# Patient Record
Sex: FEMALE | Race: BLACK OR AFRICAN AMERICAN | NOT HISPANIC OR LATINO | Employment: FULL TIME | URBAN - METROPOLITAN AREA
[De-identification: names, ages, dates, MRNs, and addresses within clinical notes are randomized per-mention and may not be internally consistent; named-entity substitution may affect disease eponyms.]

---

## 2020-12-02 ENCOUNTER — CLINICAL SUPPORT (OUTPATIENT)
Dept: URGENT CARE | Facility: CLINIC | Age: 65
End: 2020-12-02
Payer: COMMERCIAL

## 2020-12-02 VITALS — TEMPERATURE: 98 F

## 2020-12-02 DIAGNOSIS — Z20.822 EXPOSURE TO COVID-19 VIRUS: Primary | ICD-10-CM

## 2020-12-02 LAB
CTP QC/QA: YES
SARS-COV-2 RDRP RESP QL NAA+PROBE: NEGATIVE

## 2020-12-02 PROCEDURE — U0002: ICD-10-PCS | Mod: QW,S$GLB,, | Performed by: NURSE PRACTITIONER

## 2020-12-02 PROCEDURE — U0002 COVID-19 LAB TEST NON-CDC: HCPCS | Mod: QW,S$GLB,, | Performed by: NURSE PRACTITIONER

## 2021-12-08 ENCOUNTER — TELEPHONE (OUTPATIENT)
Dept: TRANSPLANT | Facility: CLINIC | Age: 66
End: 2021-12-08
Payer: MEDICARE

## 2021-12-08 DIAGNOSIS — I50.9 CONGESTIVE HEART FAILURE, UNSPECIFIED HF CHRONICITY, UNSPECIFIED HEART FAILURE TYPE: Primary | ICD-10-CM

## 2022-01-03 ENCOUNTER — OFFICE VISIT (OUTPATIENT)
Dept: TRANSPLANT | Facility: CLINIC | Age: 67
End: 2022-01-03
Payer: MEDICARE

## 2022-01-03 ENCOUNTER — LAB VISIT (OUTPATIENT)
Dept: LAB | Facility: HOSPITAL | Age: 67
End: 2022-01-03
Attending: INTERNAL MEDICINE
Payer: COMMERCIAL

## 2022-01-03 VITALS
HEIGHT: 66 IN | HEART RATE: 64 BPM | SYSTOLIC BLOOD PRESSURE: 112 MMHG | DIASTOLIC BLOOD PRESSURE: 73 MMHG | BODY MASS INDEX: 26.72 KG/M2 | WEIGHT: 166.25 LBS

## 2022-01-03 DIAGNOSIS — I25.10 CORONARY ARTERY DISEASE INVOLVING NATIVE CORONARY ARTERY OF NATIVE HEART WITHOUT ANGINA PECTORIS: ICD-10-CM

## 2022-01-03 DIAGNOSIS — E78.5 DYSLIPIDEMIA: ICD-10-CM

## 2022-01-03 DIAGNOSIS — I50.9 CONGESTIVE HEART FAILURE, UNSPECIFIED HF CHRONICITY, UNSPECIFIED HEART FAILURE TYPE: ICD-10-CM

## 2022-01-03 DIAGNOSIS — I50.9 CONGESTIVE HEART FAILURE, UNSPECIFIED HF CHRONICITY, UNSPECIFIED HEART FAILURE TYPE: Primary | ICD-10-CM

## 2022-01-03 DIAGNOSIS — R06.02 SHORTNESS OF BREATH: ICD-10-CM

## 2022-01-03 DIAGNOSIS — Z87.891 SMOKING HX: ICD-10-CM

## 2022-01-03 DIAGNOSIS — E11.9 DIABETES MELLITUS TYPE 2 WITHOUT RETINOPATHY: ICD-10-CM

## 2022-01-03 LAB
ALBUMIN SERPL BCP-MCNC: 4.2 G/DL (ref 3.5–5.2)
ALP SERPL-CCNC: 60 U/L (ref 55–135)
ALT SERPL W/O P-5'-P-CCNC: 35 U/L (ref 10–44)
ANION GAP SERPL CALC-SCNC: 13 MMOL/L (ref 8–16)
AST SERPL-CCNC: 25 U/L (ref 10–40)
BASOPHILS # BLD AUTO: 0.03 K/UL (ref 0–0.2)
BASOPHILS NFR BLD: 0.4 % (ref 0–1.9)
BILIRUB SERPL-MCNC: 0.7 MG/DL (ref 0.1–1)
BNP SERPL-MCNC: 96 PG/ML (ref 0–99)
BUN SERPL-MCNC: 18 MG/DL (ref 8–23)
CALCIUM SERPL-MCNC: 10 MG/DL (ref 8.7–10.5)
CHLORIDE SERPL-SCNC: 101 MMOL/L (ref 95–110)
CO2 SERPL-SCNC: 28 MMOL/L (ref 23–29)
CREAT SERPL-MCNC: 0.8 MG/DL (ref 0.5–1.4)
DIFFERENTIAL METHOD: ABNORMAL
EOSINOPHIL # BLD AUTO: 0.1 K/UL (ref 0–0.5)
EOSINOPHIL NFR BLD: 1.3 % (ref 0–8)
ERYTHROCYTE [DISTWIDTH] IN BLOOD BY AUTOMATED COUNT: 14.9 % (ref 11.5–14.5)
EST. GFR  (AFRICAN AMERICAN): >60 ML/MIN/1.73 M^2
EST. GFR  (NON AFRICAN AMERICAN): >60 ML/MIN/1.73 M^2
ESTIMATED AVG GLUCOSE: 140 MG/DL (ref 68–131)
GLUCOSE SERPL-MCNC: 120 MG/DL (ref 70–110)
HBA1C MFR BLD: 6.5 % (ref 4–5.6)
HCT VFR BLD AUTO: 41.2 % (ref 37–48.5)
HGB BLD-MCNC: 13.3 G/DL (ref 12–16)
IMM GRANULOCYTES # BLD AUTO: 0.02 K/UL (ref 0–0.04)
IMM GRANULOCYTES NFR BLD AUTO: 0.3 % (ref 0–0.5)
LYMPHOCYTES # BLD AUTO: 3.7 K/UL (ref 1–4.8)
LYMPHOCYTES NFR BLD: 51.2 % (ref 18–48)
MAGNESIUM SERPL-MCNC: 1.9 MG/DL (ref 1.6–2.6)
MCH RBC QN AUTO: 29 PG (ref 27–31)
MCHC RBC AUTO-ENTMCNC: 32.3 G/DL (ref 32–36)
MCV RBC AUTO: 90 FL (ref 82–98)
MONOCYTES # BLD AUTO: 0.4 K/UL (ref 0.3–1)
MONOCYTES NFR BLD: 6.1 % (ref 4–15)
NEUTROPHILS # BLD AUTO: 2.9 K/UL (ref 1.8–7.7)
NEUTROPHILS NFR BLD: 40.7 % (ref 38–73)
NRBC BLD-RTO: 0 /100 WBC
PLATELET # BLD AUTO: 184 K/UL (ref 150–450)
PMV BLD AUTO: 11.2 FL (ref 9.2–12.9)
POTASSIUM SERPL-SCNC: 3.7 MMOL/L (ref 3.5–5.1)
PROT SERPL-MCNC: 7.7 G/DL (ref 6–8.4)
RBC # BLD AUTO: 4.59 M/UL (ref 4–5.4)
SODIUM SERPL-SCNC: 142 MMOL/L (ref 136–145)
TSH SERPL DL<=0.005 MIU/L-ACNC: 1.11 UIU/ML (ref 0.4–4)
WBC # BLD AUTO: 7.19 K/UL (ref 3.9–12.7)

## 2022-01-03 PROCEDURE — 84443 ASSAY THYROID STIM HORMONE: CPT | Performed by: INTERNAL MEDICINE

## 2022-01-03 PROCEDURE — 80053 COMPREHEN METABOLIC PANEL: CPT | Performed by: INTERNAL MEDICINE

## 2022-01-03 PROCEDURE — 99999 PR PBB SHADOW E&M-EST. PATIENT-LVL IV: ICD-10-PCS | Mod: PBBFAC,,, | Performed by: INTERNAL MEDICINE

## 2022-01-03 PROCEDURE — 83735 ASSAY OF MAGNESIUM: CPT | Performed by: INTERNAL MEDICINE

## 2022-01-03 PROCEDURE — 85025 COMPLETE CBC W/AUTO DIFF WBC: CPT | Performed by: INTERNAL MEDICINE

## 2022-01-03 PROCEDURE — 99999 PR PBB SHADOW E&M-EST. PATIENT-LVL IV: CPT | Mod: PBBFAC,,, | Performed by: INTERNAL MEDICINE

## 2022-01-03 PROCEDURE — 99204 OFFICE O/P NEW MOD 45 MIN: CPT | Mod: S$PBB,,, | Performed by: INTERNAL MEDICINE

## 2022-01-03 PROCEDURE — 99214 OFFICE O/P EST MOD 30 MIN: CPT | Mod: PBBFAC | Performed by: INTERNAL MEDICINE

## 2022-01-03 PROCEDURE — 83036 HEMOGLOBIN GLYCOSYLATED A1C: CPT | Performed by: INTERNAL MEDICINE

## 2022-01-03 PROCEDURE — 36415 COLL VENOUS BLD VENIPUNCTURE: CPT | Performed by: INTERNAL MEDICINE

## 2022-01-03 PROCEDURE — 99204 PR OFFICE/OUTPT VISIT, NEW, LEVL IV, 45-59 MIN: ICD-10-PCS | Mod: S$PBB,,, | Performed by: INTERNAL MEDICINE

## 2022-01-03 PROCEDURE — 83880 ASSAY OF NATRIURETIC PEPTIDE: CPT | Mod: 91 | Performed by: INTERNAL MEDICINE

## 2022-01-03 PROCEDURE — 83880 ASSAY OF NATRIURETIC PEPTIDE: CPT | Performed by: INTERNAL MEDICINE

## 2022-01-03 RX ORDER — ALBUTEROL SULFATE 90 UG/1
2 AEROSOL, METERED RESPIRATORY (INHALATION) EVERY 4 HOURS PRN
COMMUNITY
Start: 2021-03-14

## 2022-01-03 RX ORDER — ISOSORBIDE MONONITRATE 30 MG/1
30 TABLET, EXTENDED RELEASE ORAL DAILY
COMMUNITY
Start: 2021-10-21 | End: 2023-01-06

## 2022-01-03 RX ORDER — SACUBITRIL AND VALSARTAN 24; 26 MG/1; MG/1
1 TABLET, FILM COATED ORAL 2 TIMES DAILY
Qty: 60 TABLET | Refills: 5 | Status: SHIPPED | OUTPATIENT
Start: 2022-01-03 | End: 2022-02-02

## 2022-01-03 RX ORDER — METOPROLOL SUCCINATE 100 MG/1
150 TABLET, EXTENDED RELEASE ORAL DAILY
COMMUNITY
Start: 2021-12-17

## 2022-01-03 RX ORDER — BUMETANIDE 2 MG/1
2 TABLET ORAL DAILY
Status: ON HOLD | COMMUNITY
Start: 2021-12-24 | End: 2022-02-02 | Stop reason: SDUPTHER

## 2022-01-03 RX ORDER — GABAPENTIN 300 MG/1
300 CAPSULE ORAL 3 TIMES DAILY PRN
COMMUNITY

## 2022-01-03 RX ORDER — POTASSIUM CHLORIDE 750 MG/1
20 TABLET, EXTENDED RELEASE ORAL DAILY
Status: ON HOLD | COMMUNITY
Start: 2021-12-26 | End: 2022-02-02 | Stop reason: HOSPADM

## 2022-01-03 RX ORDER — ALLOPURINOL 100 MG/1
100 TABLET ORAL
COMMUNITY

## 2022-01-03 RX ORDER — CETIRIZINE HYDROCHLORIDE 10 MG/1
10 TABLET ORAL DAILY
COMMUNITY
Start: 2021-08-22

## 2022-01-03 RX ORDER — FLUCONAZOLE 100 MG/1
100 TABLET ORAL DAILY
COMMUNITY
Start: 2021-11-04 | End: 2023-01-06

## 2022-01-03 RX ORDER — BUDESONIDE AND FORMOTEROL FUMARATE DIHYDRATE 80; 4.5 UG/1; UG/1
2 AEROSOL RESPIRATORY (INHALATION)
COMMUNITY
Start: 2021-03-14

## 2022-01-03 RX ORDER — LISINOPRIL 5 MG/1
5 TABLET ORAL DAILY
COMMUNITY
Start: 2021-10-25 | End: 2022-01-03 | Stop reason: ALTCHOICE

## 2022-01-03 RX ORDER — SACUBITRIL AND VALSARTAN 24; 26 MG/1; MG/1
1 TABLET, FILM COATED ORAL 2 TIMES DAILY
Qty: 60 TABLET | Refills: 5 | Status: SHIPPED | OUTPATIENT
Start: 2022-01-03 | End: 2022-01-03 | Stop reason: SDUPTHER

## 2022-01-03 RX ORDER — ATORVASTATIN CALCIUM 80 MG/1
80 TABLET, FILM COATED ORAL DAILY
COMMUNITY
Start: 2021-10-19

## 2022-01-03 RX ORDER — ASPIRIN 81 MG/1
81 TABLET ORAL
COMMUNITY

## 2022-01-03 RX ORDER — METFORMIN HYDROCHLORIDE 500 MG/1
TABLET, EXTENDED RELEASE ORAL DAILY
COMMUNITY
Start: 2021-10-21

## 2022-01-03 RX ORDER — EMPAGLIFLOZIN 10 MG/1
10 TABLET, FILM COATED ORAL DAILY
COMMUNITY
Start: 2021-12-26 | End: 2023-01-06

## 2022-01-03 RX ORDER — TICAGRELOR 90 MG/1
90 TABLET ORAL 2 TIMES DAILY
COMMUNITY
Start: 2021-12-17

## 2022-01-03 NOTE — H&P (VIEW-ONLY)
"                                                                                       [facility] Follow up.        Attending Physician: Jabari Godfrey MD.  The patient's last visit with me was on Visit date not found.         HPI.  67 yo woman with a PMH/o HTN, dyslipidemia, diabetes type 2, CAD, STEMI Feb, 2021, evidence on MI in LAD distribution on nuclear stress test, last [facility] February 2021, when she had diffuse distal RCA 80-90% disease, ostial OM1 disease and LAD occlusion s/p PCI to LAD at that time. Ischemic cardiomyopathy, TTE on July 26, 2021: 25-30%. S/p ICD.  She also has a history of COPD, gout. Per her outside records, she has been treated with lisinopril 5 mg daily, metoprolol xl 150 mg daily, empagliflozin 10 mg daily, ISMN, bumex 1 mg daily, asa, statins.    Today she said she feels well.  She does not feel limited. Only time that she becomes short of breath is when climbing stairs. She denies congestive symptoms. She is active at home. She said since she has been taking bumex, she feels "pretty good". After her index MI she only was admitted for HF only once. She said it was before she started taking diuretics.     Review of Systems   Constitutional: Negative for activity change, appetite change, chills, diaphoresis, fatigue, fever and unexpected weight change.   HENT: Negative for nasal congestion, rhinorrhea and sore throat.    Eyes: Negative for visual disturbance.   Respiratory: Negative for apnea, chest tightness and shortness of breath.    Cardiovascular: Negative for chest pain and leg swelling.   Gastrointestinal: Negative for abdominal distention, abdominal pain, diarrhea, nausea and vomiting.   Genitourinary: Negative for difficulty urinating, dysuria and hematuria.   Integumentary:  Negative for rash.   Neurological: Negative for seizures, syncope and light-headedness.   Psychiatric/Behavioral: Negative for agitation and hallucinations.    " "        Review of patient's allergies indicates:   Allergen Reactions    Sulfa (sulfonamide antibiotics) Itching    Penicillins Rash        Current Outpatient Medications   Medication Instructions    albuterol (PROVENTIL/VENTOLIN HFA) 90 mcg/actuation inhaler 2 puffs, Inhalation, Every 4 hours PRN    allopurinoL (ZYLOPRIM) 100 mg, Oral    aspirin (ECOTRIN) 81 mg, Oral    atorvastatin (LIPITOR) 80 mg, Oral, Daily    BRILINTA 90 mg, Oral, 2 times daily    budesonide-formoterol 80-4.5 mcg (SYMBICORT) 80-4.5 mcg/actuation HFAA 2 puffs, Inhalation    bumetanide (BUMEX) 2 mg, Oral, Daily    cetirizine (ZYRTEC) 10 mg, Oral, Daily    fluconazole (DIFLUCAN) 100 mg, Oral, Daily    gabapentin (NEURONTIN) 300 mg, Oral, 3 times daily PRN    isosorbide mononitrate (IMDUR) 30 mg, Oral, Daily    JARDIANCE 10 mg, Oral, Daily    lisinopriL (PRINIVIL,ZESTRIL) 5 mg, Oral, Daily    metFORMIN (GLUCOPHAGE-XR) 500 MG ER 24hr tablet Oral, Daily    metoprolol succinate (TOPROL-XL) 150 mg, Oral, Daily    potassium chloride (KLOR-CON) 10 MEQ TbSR 20 mEq, Oral, Daily        Vitals:    01/03/22 0826   BP: 112/73   Pulse: 64        Wt Readings from Last 3 Encounters:   01/03/22 75.4 kg (166 lb 3.6 oz)     Temp Readings from Last 3 Encounters:   12/02/20 97.7 °F (36.5 °C)     BP Readings from Last 3 Encounters:   01/03/22 112/73     Pulse Readings from Last 3 Encounters:   01/03/22 64        Body mass index is 26.83 kg/m². Estimated body surface area is 1.87 meters squared as calculated from the following:    Height as of this encounter: 5' 6" (1.676 m).    Weight as of this encounter: 75.4 kg (166 lb 3.6 oz).     Physical Exam  Constitutional:       Appearance: She is well-developed and well-nourished.   HENT:      Head: Normocephalic and atraumatic.      Right Ear: External ear normal.      Left Ear: External ear normal.   Eyes:      Extraocular Movements: EOM normal.      Conjunctiva/sclera: Conjunctivae normal.      Pupils: " Pupils are equal, round, and reactive to light.   Neck:      Vascular: No hepatojugular reflux or JVD.   Cardiovascular:      Rate and Rhythm: Normal rate and regular rhythm.      Pulses: Intact distal pulses.      Heart sounds: S1 normal and S2 normal. No murmur heard.  No friction rub. No gallop.    Pulmonary:      Effort: Pulmonary effort is normal.      Breath sounds: Normal breath sounds.   Abdominal:      General: Bowel sounds are normal. There is no distension.      Palpations: Abdomen is soft.      Tenderness: There is no abdominal tenderness. There is no guarding or rebound.   Musculoskeletal:         General: No edema.      Cervical back: Normal range of motion and neck supple.      Right lower leg: No edema.      Left lower leg: No edema.   Neurological:      Mental Status: She is alert and oriented to person, place, and time.      Deep Tendon Reflexes: Strength normal.          Lab Results   Component Value Date    HGBA1C 6.8 (H) 08/18/2021    CHOL 137 02/08/2021    HDL 29 (L) 02/08/2021    LDLCALC 72 02/08/2021    TRIG 181 (H) 02/08/2021               Assessment and Plan:  Congestive heart failure, unspecified HF chronicity, unspecified heart failure type  -     CBC Auto Differential; Future; Expected date: 01/03/2022  -     Comprehensive Metabolic Panel; Future; Expected date: 01/03/2022  -     NT-Pro Natriuretic Peptide; Future; Expected date: 01/03/2022  -     BNP; Future; Expected date: 01/03/2022  -     Hemoglobin A1C; Future; Expected date: 01/03/2022  -     CPX Study; Future; Expected date: 01/04/2022  -     Echo; Future; Expected date: 01/03/2022  -     EKG 12-lead; Future; Expected date: 01/03/2022  -     Case Request-Cath Lab: INSERTION, CATHETER, RIGHT HEART  -     Basic Metabolic Panel; Future; Expected date: 01/10/2022  -     CBC Auto Differential; Future; Expected date: 01/10/2022  -     Basic Metabolic Panel; Future; Expected date: 01/10/2022  -     CBC Auto Differential; Future; Expected  date: 04/03/2022  -     Comprehensive Metabolic Panel; Future; Expected date: 04/03/2022  -     NT-Pro Natriuretic Peptide; Future; Expected date: 04/03/2022    Shortness of breath  -     COVID-19 Routine Screening; Future; Expected date: 01/04/2022    Coronary artery disease involving native coronary artery of native heart without angina pectoris    Diabetes mellitus type 2 without retinopathy    Dyslipidemia    Smoking hx    Other orders  -     Discontinue: sacubitriL-valsartan (ENTRESTO) 24-26 mg per tablet; Take 1 tablet by mouth 2 (two) times daily.  Dispense: 60 tablet; Refill: 5  -     sacubitriL-valsartan (ENTRESTO) 24-26 mg per tablet; Take 1 tablet by mouth 2 (two) times daily.  Dispense: 60 tablet; Refill: 5          1. Chronic systolic HF, NYHA class II, stage C.  Etiology: ischemic CMP, new diagnosis in 2021, after STEMI in LAD distribution.  Devices: ICD.  Medications: metoprolol succinate, lisinopril low dose and jardiance. Bumex 1-2 mg daily (1 mg daily most of the days).  Hemodynamic status: warm, normotensive, euvolemic on exam.  Clinical course:   Plan:  -patient to adhere to a fluid restriction of NMT 1.5 liters/24h.  -patient to adhere to  low sodium diet, NMT 1.5 grams of sodium in a day.  -patient was asked to weight himself every day and to keep daily record of his weights.    -Baseline blood tests today (I ordered them).  -ECG, echo, right heart cath and exercise test at patient's earliest convenience.  -switch from lisinopril to entresto. Patient was asked to stop taking lisinopril 2 days before starting entresto.  -Follow up blood test Monday (any [facility] lab).  -patient was asked to call us every 2 weeks for next upgrade on her medications.  -Follow up in 3 months with labs.

## 2022-01-03 NOTE — PROGRESS NOTES
"                                                                                       Advanced [facility] Failure [facility] Transplantation [facility] Follow up.        Attending Physician: Jabari Godfrey MD.  The patient's last visit with me was on Visit date not found.         HPI.  65 yo woman with a PMH/o HTN, dyslipidemia, diabetes type 2, CAD, STEMI Feb, 2021, evidence on MI in LAD distribution on nuclear stress test, last [facility] February 2021, when she had diffuse distal RCA 80-90% disease, ostial OM1 disease and LAD occlusion s/p PCI to LAD at that time. Ischemic cardiomyopathy, TTE on July 26, 2021: 25-30%. S/p ICD.  She also has a history of COPD, gout. Per her outside records, she has been treated with lisinopril 5 mg daily, metoprolol xl 150 mg daily, empagliflozin 10 mg daily, ISMN, bumex 1 mg daily, asa, statins.    Today she said she feels well.  She does not feel limited. Only time that she becomes short of breath is when climbing stairs. She denies congestive symptoms. She is active at home. She said since she has been taking bumex, she feels "pretty good". After her index MI she only was admitted for HF only once. She said it was before she started taking diuretics.     Review of Systems   Constitutional: Negative for activity change, appetite change, chills, diaphoresis, fatigue, fever and unexpected weight change.   HENT: Negative for nasal congestion, rhinorrhea and sore throat.    Eyes: Negative for visual disturbance.   Respiratory: Negative for apnea, chest tightness and shortness of breath.    Cardiovascular: Negative for chest pain and leg swelling.   Gastrointestinal: Negative for abdominal distention, abdominal pain, diarrhea, nausea and vomiting.   Genitourinary: Negative for difficulty urinating, dysuria and hematuria.   Integumentary:  Negative for rash.   Neurological: Negative for seizures, syncope and light-headedness.   Psychiatric/Behavioral: Negative for agitation and hallucinations.    " "        Review of patient's allergies indicates:   Allergen Reactions    Sulfa (sulfonamide antibiotics) Itching    Penicillins Rash        Current Outpatient Medications   Medication Instructions    albuterol (PROVENTIL/VENTOLIN HFA) 90 mcg/actuation inhaler 2 puffs, Inhalation, Every 4 hours PRN    allopurinoL (ZYLOPRIM) 100 mg, Oral    aspirin (ECOTRIN) 81 mg, Oral    atorvastatin (LIPITOR) 80 mg, Oral, Daily    BRILINTA 90 mg, Oral, 2 times daily    budesonide-formoterol 80-4.5 mcg (SYMBICORT) 80-4.5 mcg/actuation HFAA 2 puffs, Inhalation    bumetanide (BUMEX) 2 mg, Oral, Daily    cetirizine (ZYRTEC) 10 mg, Oral, Daily    fluconazole (DIFLUCAN) 100 mg, Oral, Daily    gabapentin (NEURONTIN) 300 mg, Oral, 3 times daily PRN    isosorbide mononitrate (IMDUR) 30 mg, Oral, Daily    JARDIANCE 10 mg, Oral, Daily    lisinopriL (PRINIVIL,ZESTRIL) 5 mg, Oral, Daily    metFORMIN (GLUCOPHAGE-XR) 500 MG ER 24hr tablet Oral, Daily    metoprolol succinate (TOPROL-XL) 150 mg, Oral, Daily    potassium chloride (KLOR-CON) 10 MEQ TbSR 20 mEq, Oral, Daily        Vitals:    01/03/22 0826   BP: 112/73   Pulse: 64        Wt Readings from Last 3 Encounters:   01/03/22 75.4 kg (166 lb 3.6 oz)     Temp Readings from Last 3 Encounters:   12/02/20 97.7 °F (36.5 °C)     BP Readings from Last 3 Encounters:   01/03/22 112/73     Pulse Readings from Last 3 Encounters:   01/03/22 64        Body mass index is 26.83 kg/m². Estimated body surface area is 1.87 meters squared as calculated from the following:    Height as of this encounter: 5' 6" (1.676 m).    Weight as of this encounter: 75.4 kg (166 lb 3.6 oz).     Physical Exam  Constitutional:       Appearance: She is well-developed and well-nourished.   HENT:      Head: Normocephalic and atraumatic.      Right Ear: External ear normal.      Left Ear: External ear normal.   Eyes:      Extraocular Movements: EOM normal.      Conjunctiva/sclera: Conjunctivae normal.      Pupils: " Pupils are equal, round, and reactive to light.   Neck:      Vascular: No hepatojugular reflux or JVD.   Cardiovascular:      Rate and Rhythm: Normal rate and regular rhythm.      Pulses: Intact distal pulses.      Heart sounds: S1 normal and S2 normal. No murmur heard.  No friction rub. No gallop.    Pulmonary:      Effort: Pulmonary effort is normal.      Breath sounds: Normal breath sounds.   Abdominal:      General: Bowel sounds are normal. There is no distension.      Palpations: Abdomen is soft.      Tenderness: There is no abdominal tenderness. There is no guarding or rebound.   Musculoskeletal:         General: No edema.      Cervical back: Normal range of motion and neck supple.      Right lower leg: No edema.      Left lower leg: No edema.   Neurological:      Mental Status: She is alert and oriented to person, place, and time.      Deep Tendon Reflexes: Strength normal.          Lab Results   Component Value Date    HGBA1C 6.8 (H) 08/18/2021    CHOL 137 02/08/2021    HDL 29 (L) 02/08/2021    LDLCALC 72 02/08/2021    TRIG 181 (H) 02/08/2021               Assessment and Plan:  Congestive heart failure, unspecified HF chronicity, unspecified heart failure type  -     CBC Auto Differential; Future; Expected date: 01/03/2022  -     Comprehensive Metabolic Panel; Future; Expected date: 01/03/2022  -     NT-Pro Natriuretic Peptide; Future; Expected date: 01/03/2022  -     BNP; Future; Expected date: 01/03/2022  -     Hemoglobin A1C; Future; Expected date: 01/03/2022  -     CPX Study; Future; Expected date: 01/04/2022  -     Echo; Future; Expected date: 01/03/2022  -     EKG 12-lead; Future; Expected date: 01/03/2022  -     Case Request-Cath Lab: INSERTION, CATHETER, RIGHT HEART  -     Basic Metabolic Panel; Future; Expected date: 01/10/2022  -     CBC Auto Differential; Future; Expected date: 01/10/2022  -     Basic Metabolic Panel; Future; Expected date: 01/10/2022  -     CBC Auto Differential; Future; Expected  date: 04/03/2022  -     Comprehensive Metabolic Panel; Future; Expected date: 04/03/2022  -     NT-Pro Natriuretic Peptide; Future; Expected date: 04/03/2022    Shortness of breath  -     COVID-19 Routine Screening; Future; Expected date: 01/04/2022    Coronary artery disease involving native coronary artery of native heart without angina pectoris    Diabetes mellitus type 2 without retinopathy    Dyslipidemia    Smoking hx    Other orders  -     Discontinue: sacubitriL-valsartan (ENTRESTO) 24-26 mg per tablet; Take 1 tablet by mouth 2 (two) times daily.  Dispense: 60 tablet; Refill: 5  -     sacubitriL-valsartan (ENTRESTO) 24-26 mg per tablet; Take 1 tablet by mouth 2 (two) times daily.  Dispense: 60 tablet; Refill: 5          1. Chronic systolic HF, NYHA class II, stage C.  Etiology: ischemic CMP, new diagnosis in 2021, after STEMI in LAD distribution.  Devices: ICD.  Medications: metoprolol succinate, lisinopril low dose and jardiance. Bumex 1-2 mg daily (1 mg daily most of the days).  Hemodynamic status: warm, normotensive, euvolemic on exam.  Clinical course:   Plan:  -patient to adhere to a fluid restriction of NMT 1.5 liters/24h.  -patient to adhere to  low sodium diet, NMT 1.5 grams of sodium in a day.  -patient was asked to weight himself every day and to keep daily record of his weights.    -Baseline blood tests today (I ordered them).  -ECG, echo, right heart cath and exercise test at patient's earliest convenience.  -switch from lisinopril to entresto. Patient was asked to stop taking lisinopril 2 days before starting entresto.  -Follow up blood test Monday (any [facility] lab).  -patient was asked to call us every 2 weeks for next upgrade on her medications.  -Follow up in 3 months with labs.

## 2022-01-03 NOTE — PATIENT INSTRUCTIONS
1. You have just the right amount of fluid on you.  2. Please adhere to a low sodium diet (no more than 1.5 grams of sodium in 24h).  3.   Follow fluid restriction of  2. no more than 1.5 liters in 24 hours..  4. Please have all blood tests done today, ASAP.  5. Please schedule ECG, echo, right heart cath and exercise test at your earliest convenience.  6. You have been prescribed entresto. Please stop taking lisinopril 2 days before starting entresto.  7. Call us if you can not get entresto filled.  8. Follow up blood test Monday (any [facility] lab).  9. Call us every 2 weeks for next upgrade on your medications.  10. Follow up in 3 months with labs.  11. We will call you after labs are resulted to confirm initiation of entresto.

## 2022-01-04 LAB — NT-PROBNP SERPL-MCNC: 248 PG/ML

## 2022-02-02 ENCOUNTER — HOSPITAL ENCOUNTER (OUTPATIENT)
Dept: CARDIOLOGY | Facility: HOSPITAL | Age: 67
Discharge: HOME | End: 2022-02-02
Attending: INTERNAL MEDICINE
Payer: MEDICARE

## 2022-02-02 ENCOUNTER — OFFICE VISIT (OUTPATIENT)
Dept: TRANSPLANT | Facility: CLINIC | Age: 67
End: 2022-02-02
Payer: MEDICARE

## 2022-02-02 ENCOUNTER — HOSPITAL ENCOUNTER (OUTPATIENT)
Dept: CARDIOLOGY | Facility: HOSPITAL | Age: 67
Discharge: HOME | End: 2022-02-02
Attending: INTERNAL MEDICINE | Admitting: INTERNAL MEDICINE
Payer: MEDICARE

## 2022-02-02 ENCOUNTER — HOSPITAL ENCOUNTER (OUTPATIENT)
Dept: CARDIOLOGY | Facility: CLINIC | Age: 67
Discharge: HOME | End: 2022-02-02
Payer: MEDICARE

## 2022-02-02 ENCOUNTER — HOSPITAL ENCOUNTER (OUTPATIENT)
Facility: HOSPITAL | Age: 67
Discharge: HOME | End: 2022-02-02
Attending: INTERNAL MEDICINE | Admitting: INTERNAL MEDICINE
Payer: MEDICARE

## 2022-02-02 VITALS
SYSTOLIC BLOOD PRESSURE: 114 MMHG | BODY MASS INDEX: 26.52 KG/M2 | WEIGHT: 165 LBS | HEIGHT: 66 IN | DIASTOLIC BLOOD PRESSURE: 78 MMHG | HEART RATE: 72 BPM

## 2022-02-02 VITALS
BODY MASS INDEX: 27.7 KG/M2 | WEIGHT: 166.25 LBS | HEART RATE: 79 BPM | DIASTOLIC BLOOD PRESSURE: 55 MMHG | HEIGHT: 65 IN | SYSTOLIC BLOOD PRESSURE: 95 MMHG

## 2022-02-02 VITALS
BODY MASS INDEX: 26.68 KG/M2 | HEIGHT: 66 IN | WEIGHT: 166 LBS | DIASTOLIC BLOOD PRESSURE: 68 MMHG | SYSTOLIC BLOOD PRESSURE: 102 MMHG | HEART RATE: 67 BPM

## 2022-02-02 VITALS
SYSTOLIC BLOOD PRESSURE: 119 MMHG | HEIGHT: 66 IN | BODY MASS INDEX: 26.45 KG/M2 | DIASTOLIC BLOOD PRESSURE: 72 MMHG | RESPIRATION RATE: 18 BRPM | HEART RATE: 70 BPM | OXYGEN SATURATION: 98 % | TEMPERATURE: 98 F | WEIGHT: 164.56 LBS

## 2022-02-02 DIAGNOSIS — I50.9 CONGESTIVE HEART FAILURE, UNSPECIFIED HF CHRONICITY, UNSPECIFIED HEART FAILURE TYPE: ICD-10-CM

## 2022-02-02 DIAGNOSIS — E78.5 DYSLIPIDEMIA: ICD-10-CM

## 2022-02-02 DIAGNOSIS — E11.9 DIABETES MELLITUS TYPE 2 WITHOUT RETINOPATHY: ICD-10-CM

## 2022-02-02 DIAGNOSIS — I25.10 CORONARY ARTERY DISEASE INVOLVING NATIVE CORONARY ARTERY OF NATIVE HEART WITHOUT ANGINA PECTORIS: ICD-10-CM

## 2022-02-02 DIAGNOSIS — I50.9 CONGESTIVE HEART FAILURE, UNSPECIFIED HF CHRONICITY, UNSPECIFIED HEART FAILURE TYPE: Primary | ICD-10-CM

## 2022-02-02 LAB
ASCENDING AORTA: 2.85 CM
AV INDEX (PROSTH): 0.83
AV MEAN GRADIENT: 3 MMHG
AV PEAK GRADIENT: 5 MMHG
AV VALVE AREA: 3.17 CM2
AV VELOCITY RATIO: 0.91
BSA FOR ECHO PROCEDURE: 1.87 M2
CV ECHO LV RWT: 0.27 CM
CV STRESS BASE HR: 72 BPM
DIASTOLIC BLOOD PRESSURE: 78 MMHG
DOP CALC AO PEAK VEL: 1.12 M/S
DOP CALC AO VTI: 26.04 CM
DOP CALC LVOT AREA: 3.8 CM2
DOP CALC LVOT DIAMETER: 2.21 CM
DOP CALC LVOT PEAK VEL: 1.02 M/S
DOP CALC LVOT STROKE VOLUME: 82.51 CM3
DOP CALCLVOT PEAK VEL VTI: 21.52 CM
E WAVE DECELERATION TIME: 246.52 MSEC
E/A RATIO: 0.53
E/E' RATIO: 15.14 M/S
ECHO LV POSTERIOR WALL: 0.73 CM (ref 0.6–1.1)
EJECTION FRACTION: 35 %
FRACTIONAL SHORTENING: 22 % (ref 28–44)
INTERVENTRICULAR SEPTUM: 0.64 CM (ref 0.6–1.1)
LA MAJOR: 4.86 CM
LA MINOR: 5.26 CM
LA WIDTH: 3.84 CM
LEFT ATRIUM SIZE: 3.79 CM
LEFT ATRIUM VOLUME INDEX MOD: 28.2 ML/M2
LEFT ATRIUM VOLUME INDEX: 33.8 ML/M2
LEFT ATRIUM VOLUME MOD: 52.18 CM3
LEFT ATRIUM VOLUME: 62.5 CM3
LEFT INTERNAL DIMENSION IN SYSTOLE: 4.18 CM (ref 2.1–4)
LEFT VENTRICLE DIASTOLIC VOLUME INDEX: 74.32 ML/M2
LEFT VENTRICLE DIASTOLIC VOLUME: 137.5 ML
LEFT VENTRICLE MASS INDEX: 68 G/M2
LEFT VENTRICLE SYSTOLIC VOLUME INDEX: 42 ML/M2
LEFT VENTRICLE SYSTOLIC VOLUME: 77.66 ML
LEFT VENTRICULAR INTERNAL DIMENSION IN DIASTOLE: 5.34 CM (ref 3.5–6)
LEFT VENTRICULAR MASS: 125.27 G
LV LATERAL E/E' RATIO: 17.67 M/S
LV SEPTAL E/E' RATIO: 13.25 M/S
MV A" WAVE DURATION": 8.85 MSEC
MV PEAK A VEL: 1 M/S
MV PEAK E VEL: 0.53 M/S
MV STENOSIS PRESSURE HALF TIME: 71.49 MS
MV VALVE AREA P 1/2 METHOD: 3.08 CM2
OHS CV CPX 1 MINUTE RECOVERY HEART RATE: 93 BPM
OHS CV CPX 85 PERCENT MAX PREDICTED HEART RATE MALE: 126
OHS CV CPX DATA GRADE - PEAK: 3.9
OHS CV CPX DATA O2 SAT - PEAK: 97
OHS CV CPX DATA O2 SAT - REST: 96
OHS CV CPX DATA SPEED - PEAK: 2.1
OHS CV CPX DATA TIME - PEAK: 6.18
OHS CV CPX DATA VE/VCO2 - PEAK: 45
OHS CV CPX DATA VE/VO2 - PEAK: 36
OHS CV CPX DATA VO2 - PEAK: 8.9
OHS CV CPX DATA VO2 - REST: 3.8
OHS CV CPX FEV1/FVC: 0.79
OHS CV CPX FORCED EXPIRATORY VOLUME: 1.53
OHS CV CPX FORCED VITAL CAPACITY (FVC): 1.93
OHS CV CPX HIGHEST VO: 27.5
OHS CV CPX MAX PREDICTED HEART RATE: 149
OHS CV CPX MAXIMAL VOLUNTARY VENTILATION (MVV) PREDICTED: 61.2
OHS CV CPX MAXIMAL VOLUNTARY VENTILATION (MVV): 55
OHS CV CPX MAXIUMUM EXERCISE VENTILATION (VE MAX): 6.1
OHS CV CPX PATIENT AGE: 65
OHS CV CPX PATIENT HEIGHT IN: 66
OHS CV CPX PATIENT IS FEMALE AGE 11-19: 0
OHS CV CPX PATIENT IS FEMALE AGE GREATER THAN 19: 1
OHS CV CPX PATIENT IS FEMALE AGE LESS THAN 11: 0
OHS CV CPX PATIENT IS FEMALE: 1
OHS CV CPX PATIENT IS MALE AGE 11-25: 0
OHS CV CPX PATIENT IS MALE AGE GREATER THAN 25: 0
OHS CV CPX PATIENT IS MALE AGE LESS THAN 11: 0
OHS CV CPX PATIENT IS MALE GREATER THAN 18: 0
OHS CV CPX PATIENT IS MALE LESS THAN OR EQUAL TO 18: 0
OHS CV CPX PATIENT IS MALE: 0
OHS CV CPX PATIENT WEIGHT RETURNED IN OZ: 2640
OHS CV CPX PEAK DIASTOLIC BLOOD PRESSURE: 79 MMHG
OHS CV CPX PEAK HEAR RATE: 106 BPM
OHS CV CPX PEAK RATE PRESSURE PRODUCT: NORMAL
OHS CV CPX PEAK SYSTOLIC BLOOD PRESSURE: 107 MMHG
OHS CV CPX PERCENT BODY FAT: 23.7
OHS CV CPX PERCENT MAX PREDICTED HEART RATE ACHIEVED: 71
OHS CV CPX PREDICTED VO2: 27.5 ML/KG/MIN
OHS CV CPX RATE PRESSURE PRODUCT PRESENTING: 8208
OHS CV CPX REST PET CO2: 27
OHS CV CPX VE/VCO2 SLOPE: 30.8
PISA TR MAX VEL: 2.61 M/S
PULM VEIN S/D RATIO: 2.07
PV PEAK D VEL: 0.27 M/S
PV PEAK S VEL: 0.56 M/S
RA MAJOR: 3.65 CM
RA PRESSURE: 3 MMHG
RA WIDTH: 3.06 CM
RIGHT VENTRICULAR END-DIASTOLIC DIMENSION: 3.36 CM
RV TISSUE DOPPLER FREE WALL SYSTOLIC VELOCITY 1 (APICAL 4 CHAMBER VIEW): 8.13 CM/S
SINUS: 3.11 CM
STJ: 2.75 CM
STRESS ECHO POST EXERCISE DUR MIN: 6 MINUTES
STRESS ECHO POST EXERCISE DUR SEC: 11 SECONDS
SYSTOLIC BLOOD PRESSURE: 114 MMHG
TDI LATERAL: 0.03 M/S
TDI SEPTAL: 0.04 M/S
TDI: 0.04 M/S
TR MAX PG: 27 MMHG
TRICUSPID ANNULAR PLANE SYSTOLIC EXCURSION: 1.59 CM
TV REST PULMONARY ARTERY PRESSURE: 30 MMHG

## 2022-02-02 PROCEDURE — 99214 OFFICE O/P EST MOD 30 MIN: CPT | Mod: S$PBB,,, | Performed by: INTERNAL MEDICINE

## 2022-02-02 PROCEDURE — 94621 CARDIOPULM EXERCISE TESTING: CPT

## 2022-02-02 PROCEDURE — 93306 TTE W/DOPPLER COMPLETE: CPT

## 2022-02-02 PROCEDURE — 99999 PR PBB SHADOW E&M-EST. PATIENT-LVL IV: CPT | Mod: PBBFAC,,, | Performed by: INTERNAL MEDICINE

## 2022-02-02 PROCEDURE — 93451 RIGHT HEART CATH: CPT | Performed by: INTERNAL MEDICINE

## 2022-02-02 PROCEDURE — 93451 PR RIGHT HEART CATH O2 SATURATION & CARDIAC OUTPUT: ICD-10-PCS | Mod: 26,,, | Performed by: INTERNAL MEDICINE

## 2022-02-02 PROCEDURE — 25000003 PHARM REV CODE 250: Performed by: INTERNAL MEDICINE

## 2022-02-02 PROCEDURE — 93306 TTE W/DOPPLER COMPLETE: CPT | Mod: 26,,, | Performed by: INTERNAL MEDICINE

## 2022-02-02 PROCEDURE — C1894 INTRO/SHEATH, NON-LASER: HCPCS | Performed by: INTERNAL MEDICINE

## 2022-02-02 PROCEDURE — 99999 PR PBB SHADOW E&M-EST. PATIENT-LVL IV: ICD-10-PCS | Mod: PBBFAC,,, | Performed by: INTERNAL MEDICINE

## 2022-02-02 PROCEDURE — 93306 ECHO (CUPID ONLY): ICD-10-PCS | Mod: 26,,, | Performed by: INTERNAL MEDICINE

## 2022-02-02 PROCEDURE — 99214 PR OFFICE/OUTPT VISIT, EST, LEVL IV, 30-39 MIN: ICD-10-PCS | Mod: S$PBB,,, | Performed by: INTERNAL MEDICINE

## 2022-02-02 PROCEDURE — C1751 CATH, INF, PER/CENT/MIDLINE: HCPCS | Performed by: INTERNAL MEDICINE

## 2022-02-02 PROCEDURE — 94621 CARDIOPULMONARY EXERCISE TESTING (CUPID ONLY): ICD-10-PCS | Mod: 26,,, | Performed by: INTERNAL MEDICINE

## 2022-02-02 PROCEDURE — 93010 ELECTROCARDIOGRAM REPORT: CPT | Mod: S$PBB,,, | Performed by: INTERNAL MEDICINE

## 2022-02-02 PROCEDURE — 94621 CARDIOPULM EXERCISE TESTING: CPT | Mod: 26,,, | Performed by: INTERNAL MEDICINE

## 2022-02-02 PROCEDURE — 93005 ELECTROCARDIOGRAM TRACING: CPT | Mod: PBBFAC | Performed by: INTERNAL MEDICINE

## 2022-02-02 PROCEDURE — 99214 OFFICE O/P EST MOD 30 MIN: CPT | Mod: PBBFAC,25 | Performed by: INTERNAL MEDICINE

## 2022-02-02 PROCEDURE — 93010 EKG 12-LEAD: ICD-10-PCS | Mod: S$PBB,,, | Performed by: INTERNAL MEDICINE

## 2022-02-02 PROCEDURE — 93451 RIGHT HEART CATH: CPT | Mod: 26,,, | Performed by: INTERNAL MEDICINE

## 2022-02-02 RX ORDER — BUMETANIDE 2 MG/1
TABLET ORAL
Qty: 30 TABLET | Refills: 6 | Status: SHIPPED | OUTPATIENT
Start: 2022-02-02 | End: 2023-01-06

## 2022-02-02 RX ORDER — LIDOCAINE HYDROCHLORIDE 20 MG/ML
INJECTION, SOLUTION EPIDURAL; INFILTRATION; INTRACAUDAL; PERINEURAL
Status: DISCONTINUED | OUTPATIENT
Start: 2022-02-02 | End: 2022-02-02 | Stop reason: HOSPADM

## 2022-02-02 NOTE — NURSING NOTE
Pt verified by 2 identifiers, allergies reviewed.  IV access attempted x 1 to Rt posterior FA.  Access obtained w/ full flashbasck, but unable to thread catheter but long term, vein collapsed.  Pt requesting no further attempts.

## 2022-02-02 NOTE — NURSING
Received report from Steve JACOBS. Patient s/p RHC, AAOx3. VSS, no c/o pain or discomfort at this time, resp even and unlabored. Gauze/tegaderm dressing to RIJ is CDI. No active bleeding. No hematoma noted. Post procedure protocol reviewed with patient and patient's family. Understanding verbalized. Family members at bedside. Nurse call bell within reach. Will continue to monitor per post procedure protocol.

## 2022-02-02 NOTE — PLAN OF CARE
Patient arrived to room.  Admit assessment completed. Plan of care discussed with patient.  at bedside. Nurse call bell within reach. Will monitor

## 2022-02-02 NOTE — BRIEF OP NOTE
Patient tolerated the procedure well. Please see complete RHC procedure note for full details and results. Stable to return from cath lab to their hospital room.  Procedure: Right heart catheterization   Procedure date: 02/02/22    Discharge date: 02/02/22  Estimated blood loss: less than 10 cc  Complications: none  Condition at discharge: stable  Discharge instructions: no heavy lifting greater than 5 lbs and no bearing down/coughing without holding pressure over incision site.  Activity: as tolerated after 24 hours   Diet: as tolerated  Dispo: home

## 2022-02-02 NOTE — PROGRESS NOTES
"                                                                                       [facility] Follow up.        Attending Physician: Jabari Godfrey MD.  The patient's last visit with me was on 1/3/2022.         HPI.  67 yo woman with a PMH/o HTN, dyslipidemia, diabetes type 2, CAD, STEMI Feb, 2021, evidence on MI in LAD distribution on nuclear stress test, last [facility] February 2021, when she had diffuse distal RCA 80-90% disease, ostial OM1 disease and LAD occlusion s/p PCI to LAD at that time. Ischemic cardiomyopathy, TTE on July 26, 2021: 25-30%. S/p ICD.  She also has a history of COPD, gout. Per her outside records, she has been treated with lisinopril 5 mg daily, metoprolol xl 150 mg daily, empagliflozin 10 mg daily, ISMN, bumex 1 mg daily, asa, statins.     Today she said she feels well.  She does not feel limited. Only time that she becomes short of breath is when climbing stairs. She denies congestive symptoms. She is active at home. She said since she has been taking bumex, she feels "pretty good". After her index MI she only was admitted for HF only once. She said it was before she started taking diuretics.     Today she comes to see me after having risk stratification studies. Last visit we tried to switch her to low dose entresto but she became symptomatic. She said since being on lisinopril she feels "wonderful".     Review of Systems   Constitutional: Negative for chills, diaphoresis and fever.   HENT: Negative for nasal congestion, rhinorrhea and sore throat.    Eyes: Negative for visual disturbance.   Respiratory: Negative for apnea, cough, choking, chest tightness and shortness of breath.    Cardiovascular: Negative for chest pain.   Gastrointestinal: Negative for abdominal pain, diarrhea, nausea and vomiting.   Genitourinary: Negative for difficulty urinating, dysuria and hematuria.   Integumentary:  Negative for rash.   Neurological: Negative for " seizures, syncope and light-headedness.   Psychiatric/Behavioral: Negative for agitation and hallucinations.        Past Medical History:   Diagnosis Date    CHF (congestive heart failure)     Coronary artery disease     Diabetes mellitus     Encounter for blood transfusion         Past Surgical History:   Procedure Laterality Date    CARDIAC DEFIBRILLATOR PLACEMENT      CORONARY ANGIOPLASTY WITH STENT PLACEMENT      HYSTERECTOMY          No family history on file.     Review of patient's allergies indicates:   Allergen Reactions    Sulfa (sulfonamide antibiotics) Itching    Penicillins Rash        Current Outpatient Medications   Medication Instructions    albuterol (PROVENTIL/VENTOLIN HFA) 90 mcg/actuation inhaler 2 puffs, Inhalation, Every 4 hours PRN    allopurinoL (ZYLOPRIM) 100 mg, Oral    aspirin (ECOTRIN) 81 mg, Oral    atorvastatin (LIPITOR) 80 mg, Oral, Daily    BRILINTA 90 mg, Oral, 2 times daily    budesonide-formoterol 80-4.5 mcg (SYMBICORT) 80-4.5 mcg/actuation HFAA 2 puffs, Inhalation    bumetanide (BUMEX) 2 MG tablet Half a tablet every other day    cetirizine (ZYRTEC) 10 mg, Oral, Daily    fluconazole (DIFLUCAN) 100 mg, Oral, Daily    gabapentin (NEURONTIN) 300 mg, Oral, 3 times daily PRN    isosorbide mononitrate (IMDUR) 30 mg, Oral, Daily    JARDIANCE 10 mg, Oral, Daily    metFORMIN (GLUCOPHAGE-XR) 500 MG ER 24hr tablet Oral, Daily    metoprolol succinate (TOPROL-XL) 150 mg, Oral, Daily    sacubitriL-valsartan (ENTRESTO) 24-26 mg per tablet 1 tablet, Oral, 2 times daily        Vitals:    02/02/22 1408   BP: (!) 95/55   Pulse: 79        Wt Readings from Last 3 Encounters:   02/02/22 75.4 kg (166 lb 3.6 oz)   02/02/22 74.6 kg (164 lb 9.2 oz)   02/02/22 75.3 kg (166 lb)     Temp Readings from Last 3 Encounters:   02/02/22 97.6 °F (36.4 °C) (Temporal)   12/02/20 97.7 °F (36.5 °C)     BP Readings from Last 3 Encounters:   02/02/22 (!) 95/55   02/02/22 119/72   02/02/22 102/68  "    Pulse Readings from Last 3 Encounters:   02/02/22 79   02/02/22 70   02/02/22 67        Body mass index is 27.66 kg/m². Estimated body surface area is 1.86 meters squared as calculated from the following:    Height as of this encounter: 5' 5" (1.651 m).    Weight as of this encounter: 75.4 kg (166 lb 3.6 oz).     Physical Exam     Lab Results   Component Value Date    BNP 64 02/02/2022     02/02/2022     02/02/2022    K 4.0 02/02/2022    K 4.0 02/02/2022    MG 1.9 01/03/2022     02/02/2022     02/02/2022    CO2 25 02/02/2022    CO2 25 02/02/2022    BUN 17 02/02/2022    BUN 17 02/02/2022    CREATININE 0.9 02/02/2022    CREATININE 0.9 02/02/2022     (H) 02/02/2022     (H) 02/02/2022    HGBA1C 6.5 (H) 01/03/2022    AST 23 02/02/2022    ALT 37 02/02/2022    ALBUMIN 3.7 02/02/2022    PROT 7.6 02/02/2022    BILITOT 0.8 02/02/2022    WBC 7.39 02/02/2022    HGB 12.1 02/02/2022    HCT 37.7 02/02/2022     02/02/2022    TSH 1.113 01/03/2022    CHOL 137 02/08/2021    HDL 29 (L) 02/08/2021    LDLCALC 72 02/08/2021    TRIG 181 (H) 02/08/2021         Results for orders placed during the hospital encounter of 02/02/22    Echo    Interpretation Summary  · The estimated ejection fraction is 35-40%.  · The following segments are akinetic: apical anterior, apical septal, apical inferior, apical lateral and apex.  · The left ventricle is normal in size with moderately decreased systolic function.  · Grade I left ventricular diastolic dysfunction.  · Normal right ventricular size with normal right ventricular systolic function.  · Mild tricuspid regurgitation.  · Mild-to-moderate mitral regurgitation.  · The estimated PA systolic pressure is 30 mmHg.  · Normal central venous pressure (3 mmHg).        No results found for this or any previous visit.         Assessment and Plan:  Congestive heart failure, unspecified HF chronicity, unspecified heart failure type  -     CBC Auto Differential; " Future; Expected date: 05/02/2022  -     Comprehensive Metabolic Panel; Future; Expected date: 05/02/2022  -     BNP; Future; Expected date: 05/02/2022    Coronary artery disease involving native coronary artery of native heart without angina pectoris    Dyslipidemia    Diabetes mellitus type 2 without retinopathy             1. Chronic systolic HF, NYHA class II, stage C. LVEF 35-40%.  Etiology: ischemic CMP, new diagnosis in 2021, after STEMI in LAD distribution.  Devices: ICD.  Medications: metoprolol succinate, lisinopril low dose and jardiance. Bumex 1-2 mg daily (1 mg daily most of the days).  Hemodynamic status: warm, normotensive, low volume.  Clinical course: no HF hospitalizations.  Plan:  -decrease bumex from 1 mg daily to 1 mg every other day.  -RHC with low filling pressures. CPX with poor effort. VE/VCO2 was 30. Plan to continue medical therapy and to monitor for change in clinical status.

## 2022-02-02 NOTE — PATIENT INSTRUCTIONS
1. You have low fluid levels (dehydrated).  2. Please adhere to a low sodium diet (no more than 1.5 grams of sodium in 24h).  3.   Follow fluid restriction of  1. no more than 2 liters in 24 hours..  4. Changes on bumex as discussed (every other day).  5. Follow up in 4 months with labs.

## 2022-02-02 NOTE — PATIENT INSTRUCTIONS
Make the following changes:     Decrease bumetanide (bumex) to HALF tablet every other day.  Stop potassium.    We will get repeat labs near you in 1 week.     AFTER THE PROCEDURE:  -You may remove the bandage in 24 hours and wash with soap and water.  -You may shower, but do not soak in a tub for three days.   PRECAUTIONS FOR THE NEXT 24 HOURS:  -If you need to cough, sneeze, have a bowel movement, or bear down, hold pressure over your bandage.  -Do not  anything heavier than a gallon of milk(about 5 pounds)  -Avoid excessive bending over.  SYMPTOMS TO WATCH FOR AND REPORT TO YOUR DOCTOR:  -BLEEDING: hold pressure over the site until bleeding stops. Proceed to Emergency Room by ambulance (do not drive yourself) if unable to stop bleeding. Notify your doctor.  -HEMATOMA(hard bruise under the skin): [place] around the bruise if one develops. Call your doctor if it increases in size or if you have difficulty talking, swallowing, breathing or anything unusual.  SIGNS OF INFECTION:Fever (temperature over 100.5 F), pus or redness  -RASH  -CHEST PAIN OR SHORTNESS OF BREATH    You may call you coordinator in the Heart Failure/Heart Transplant/Pulmonary Hypertension Clinic at [phone] during normal business hours(Monday through Friday from 8 A.M. to 5 P.M.) After hours, call the Heart Transplant Service doctor on call at [phone].

## 2022-02-02 NOTE — DISCHARGE SUMMARY
[facility] - Cath Lab  Discharge Note  Short Stay    Procedure(s) (LRB):  INSERTION, CATHETER, RIGHT HEART (Right)    OUTCOME: Patient tolerated treatment/procedure well without complication and is now ready for discharge.    DISPOSITION: Home or Self Care    FINAL DIAGNOSIS:  <principal problem not specified>    FOLLOWUP: In clinic    DISCHARGE INSTRUCTIONS:  No discharge procedures on file.     TIME SPENT ON DISCHARGE: 30 minutes

## 2022-02-02 NOTE — INTERVAL H&P NOTE
The patient has been examined and the H&P has been reviewed:    I concur with the findings and no changes have occurred since H&P was written.    Procedure risks, benefits and alternative options discussed and understood by patient/family.    Patient getting RHC to evaluate filling pressures and cardiac flows. I have explained the risks, benefits, and alternatives of the procedure in detail.  The patient voices understanding and all questions have been answered.  The patient agrees to proceed as planned.        There are no hospital problems to display for this patient.

## 2022-02-02 NOTE — Clinical Note
The PA catheter was repositioned to the main pulmonary artery. Hemodynamics were performed. O2 saturation was measured at 57%.

## 2022-09-12 ENCOUNTER — LAB VISIT (OUTPATIENT)
Dept: LAB | Facility: HOSPITAL | Age: 67
End: 2022-09-12
Attending: INTERNAL MEDICINE
Payer: MEDICARE

## 2022-09-12 ENCOUNTER — OFFICE VISIT (OUTPATIENT)
Dept: TRANSPLANT | Facility: CLINIC | Age: 67
End: 2022-09-12
Payer: MEDICARE

## 2022-09-12 VITALS
BODY MASS INDEX: 28.27 KG/M2 | HEIGHT: 66 IN | SYSTOLIC BLOOD PRESSURE: 117 MMHG | HEART RATE: 67 BPM | WEIGHT: 175.94 LBS | DIASTOLIC BLOOD PRESSURE: 74 MMHG

## 2022-09-12 DIAGNOSIS — I50.42 CHRONIC COMBINED SYSTOLIC AND DIASTOLIC CONGESTIVE HEART FAILURE: Primary | ICD-10-CM

## 2022-09-12 DIAGNOSIS — F17.201 TOBACCO ABUSE, IN REMISSION: ICD-10-CM

## 2022-09-12 DIAGNOSIS — I25.5 ISCHEMIC CARDIOMYOPATHY: ICD-10-CM

## 2022-09-12 DIAGNOSIS — I50.9 CONGESTIVE HEART FAILURE, UNSPECIFIED HF CHRONICITY, UNSPECIFIED HEART FAILURE TYPE: ICD-10-CM

## 2022-09-12 DIAGNOSIS — E11.9 DIABETES MELLITUS TYPE 2 WITHOUT RETINOPATHY: ICD-10-CM

## 2022-09-12 LAB
ALBUMIN SERPL BCP-MCNC: 3.7 G/DL (ref 3.5–5.2)
ALP SERPL-CCNC: 47 U/L (ref 55–135)
ALT SERPL W/O P-5'-P-CCNC: 23 U/L (ref 10–44)
ANION GAP SERPL CALC-SCNC: 7 MMOL/L (ref 8–16)
AST SERPL-CCNC: 20 U/L (ref 10–40)
BASOPHILS # BLD AUTO: 0.02 K/UL (ref 0–0.2)
BASOPHILS NFR BLD: 0.3 % (ref 0–1.9)
BILIRUB SERPL-MCNC: 0.5 MG/DL (ref 0.1–1)
BNP SERPL-MCNC: 91 PG/ML (ref 0–99)
BUN SERPL-MCNC: 10 MG/DL (ref 8–23)
CALCIUM SERPL-MCNC: 9.2 MG/DL (ref 8.7–10.5)
CHLORIDE SERPL-SCNC: 103 MMOL/L (ref 95–110)
CO2 SERPL-SCNC: 28 MMOL/L (ref 23–29)
CREAT SERPL-MCNC: 0.8 MG/DL (ref 0.5–1.4)
DIFFERENTIAL METHOD: ABNORMAL
EOSINOPHIL # BLD AUTO: 0.1 K/UL (ref 0–0.5)
EOSINOPHIL NFR BLD: 2.3 % (ref 0–8)
ERYTHROCYTE [DISTWIDTH] IN BLOOD BY AUTOMATED COUNT: 14.3 % (ref 11.5–14.5)
EST. GFR  (NO RACE VARIABLE): >60 ML/MIN/1.73 M^2
GLUCOSE SERPL-MCNC: 166 MG/DL (ref 70–110)
HCT VFR BLD AUTO: 35.6 % (ref 37–48.5)
HGB BLD-MCNC: 11.7 G/DL (ref 12–16)
IMM GRANULOCYTES # BLD AUTO: 0.01 K/UL (ref 0–0.04)
IMM GRANULOCYTES NFR BLD AUTO: 0.2 % (ref 0–0.5)
LYMPHOCYTES # BLD AUTO: 2.8 K/UL (ref 1–4.8)
LYMPHOCYTES NFR BLD: 45.1 % (ref 18–48)
MCH RBC QN AUTO: 29.8 PG (ref 27–31)
MCHC RBC AUTO-ENTMCNC: 32.9 G/DL (ref 32–36)
MCV RBC AUTO: 91 FL (ref 82–98)
MONOCYTES # BLD AUTO: 0.5 K/UL (ref 0.3–1)
MONOCYTES NFR BLD: 7.4 % (ref 4–15)
NEUTROPHILS # BLD AUTO: 2.8 K/UL (ref 1.8–7.7)
NEUTROPHILS NFR BLD: 44.7 % (ref 38–73)
NRBC BLD-RTO: 0 /100 WBC
PLATELET # BLD AUTO: 196 K/UL (ref 150–450)
PMV BLD AUTO: 11.4 FL (ref 9.2–12.9)
POTASSIUM SERPL-SCNC: 3.6 MMOL/L (ref 3.5–5.1)
PROT SERPL-MCNC: 7 G/DL (ref 6–8.4)
RBC # BLD AUTO: 3.93 M/UL (ref 4–5.4)
SODIUM SERPL-SCNC: 138 MMOL/L (ref 136–145)
WBC # BLD AUTO: 6.19 K/UL (ref 3.9–12.7)

## 2022-09-12 PROCEDURE — 99999 PR PBB SHADOW E&M-EST. PATIENT-LVL III: CPT | Mod: PBBFAC,,, | Performed by: INTERNAL MEDICINE

## 2022-09-12 PROCEDURE — 99214 PR OFFICE/OUTPT VISIT, EST, LEVL IV, 30-39 MIN: ICD-10-PCS | Mod: S$GLB,,, | Performed by: INTERNAL MEDICINE

## 2022-09-12 PROCEDURE — 36415 COLL VENOUS BLD VENIPUNCTURE: CPT | Performed by: INTERNAL MEDICINE

## 2022-09-12 PROCEDURE — 85025 COMPLETE CBC W/AUTO DIFF WBC: CPT | Performed by: INTERNAL MEDICINE

## 2022-09-12 PROCEDURE — 80053 COMPREHEN METABOLIC PANEL: CPT | Performed by: INTERNAL MEDICINE

## 2022-09-12 PROCEDURE — 99213 OFFICE O/P EST LOW 20 MIN: CPT | Mod: PBBFAC | Performed by: INTERNAL MEDICINE

## 2022-09-12 PROCEDURE — 83880 ASSAY OF NATRIURETIC PEPTIDE: CPT | Performed by: INTERNAL MEDICINE

## 2022-09-12 PROCEDURE — 99214 OFFICE O/P EST MOD 30 MIN: CPT | Mod: S$GLB,,, | Performed by: INTERNAL MEDICINE

## 2022-09-12 PROCEDURE — 99999 PR PBB SHADOW E&M-EST. PATIENT-LVL III: ICD-10-PCS | Mod: PBBFAC,,, | Performed by: INTERNAL MEDICINE

## 2022-09-12 RX ORDER — TRIAMCINOLONE ACETONIDE 1 MG/G
CREAM TOPICAL
COMMUNITY
Start: 2022-06-24

## 2022-09-12 RX ORDER — LISINOPRIL 5 MG/1
2.5 TABLET ORAL DAILY
COMMUNITY
Start: 2022-08-17 | End: 2023-01-06 | Stop reason: SDUPTHER

## 2022-09-12 NOTE — PROGRESS NOTES
HPI.  65 yo woman with a PMH/o HTN, dyslipidemia, diabetes type 2, CAD, STEMI Feb, 2021, evidence on MI in LAD distribution on nuclear stress test, last LHC February 2021, when she had diffuse distal RCA 80-90% disease, ostial OM1 disease and LAD occlusion s/p PCI to LAD at that time. Ischemic cardiomyopathy, TTE on July 26, 2021: 25-30%. S/p ICD.  She also has a history of COPD, gout. Per her outside records, she has been treated with lisinopril 5 mg daily, metoprolol xl 150 mg daily, empagliflozin 10 mg daily, ISMN, bumex 1 mg daily, asa, statins.     Patient underwent RHC/CPX and TTE. TTE with LVEF 02/22 35-40%. RHC with low filling pressures, CI 1.96. did not tolerate entresto, switched back to lisinopril and felt great after doing so. Recently in August wasn't having any simptoms but her pcp wanted to re-evaluate her for CAD. Underwent LHC at [facility]  08/08/24, for this found to have radiolucency ostial LCMX suggestive of severe ISR, patient LAD stent with no significant ISR, unchanged RCA disease from prior angiogram. Patient on DAPT with statin. No further angina but has noticed some LUTZ going up and down the stairs. That being said, fluid status is stable, no volume issues at all, no admissions for CHF in a couple of years right after her MI. Patient denies N/V/F/C, lightheadedness, dizziness, PND, orthopnea, LE edema, abdominal pain, abdominal pressure, chest pain, chest pressure, syncope, pre-syncope. NYHA FC II.       Past Medical History:   Diagnosis Date    CHF (congestive heart failure)     Coronary artery disease     Diabetes mellitus     Encounter for blood transfusion         Past Surgical History:   Procedure Laterality Date    CARDIAC DEFIBRILLATOR PLACEMENT      CORONARY ANGIOPLASTY WITH STENT PLACEMENT      HYSTERECTOMY      RIGHT HEART CATHETERIZATION Right 2/2/2022    Procedure: INSERTION, CATHETER, RIGHT HEART;  Surgeon: Ashley Salgado MD;  Location: [facility] CATH LAB;  Service: Cardiology;   Laterality: Right;        No family history on file.     Review of patient's allergies indicates:   Allergen Reactions    Sulfa (sulfonamide antibiotics) Itching    Penicillins Rash        Current Outpatient Medications   Medication Instructions    albuterol (PROVENTIL/VENTOLIN HFA) 90 mcg/actuation inhaler 2 puffs, Inhalation, Every 4 hours PRN    allopurinoL (ZYLOPRIM) 100 mg, Oral    aspirin (ECOTRIN) 81 mg, Oral    atorvastatin (LIPITOR) 80 mg, Oral, Daily    BRILINTA 90 mg, Oral, 2 times daily    budesonide-formoterol 80-4.5 mcg (SYMBICORT) 80-4.5 mcg/actuation HFAA 2 puffs, Inhalation    bumetanide (BUMEX) 2 MG tablet Half a tablet every other day    cetirizine (ZYRTEC) 10 mg, Oral, Daily    fluconazole (DIFLUCAN) 100 mg, Oral, Daily    gabapentin (NEURONTIN) 300 mg, Oral, 3 times daily PRN    isosorbide mononitrate (IMDUR) 30 mg, Oral, Daily    JARDIANCE 10 mg, Oral, Daily    lisinopriL (PRINIVIL,ZESTRIL) 2.5 mg, Oral, Daily    metFORMIN (GLUCOPHAGE-XR) 500 MG ER 24hr tablet Oral, Daily    metoprolol succinate (TOPROL-XL) 150 mg, Oral, Daily    triamcinolone acetonide 0.1% (KENALOG) 0.1 % cream APPLY TWICE A DAY AS NEEDED FOR ITCHY RAISED SKIN. DO NOT USE ON FACE, ARMPITS, OR GROIN.        Lab Results   Component Value Date    BNP 91 09/12/2022     09/12/2022    K 3.6 09/12/2022    MG 1.9 01/03/2022     09/12/2022    CO2 28 09/12/2022    BUN 10 09/12/2022    CREATININE 0.8 09/12/2022     (H) 09/12/2022    HGBA1C 7.1 (H) 05/02/2022    HGBA1C 6.5 (H) 01/03/2022    AST 20 09/12/2022    ALT 23 09/12/2022    ALBUMIN 3.7 09/12/2022    PROT 7.0 09/12/2022    BILITOT 0.5 09/12/2022    WBC 6.19 09/12/2022    HGB 11.7 (L) 09/12/2022    HCT 35.6 (L) 09/12/2022     09/12/2022    TSH 1.113 01/03/2022    CHOL 135 02/21/2022    HDL 43 02/21/2022    LDLCALC 62 02/21/2022    TRIG 151 (H) 02/21/2022     No results found for this or any previous visit.     Assessment and Plan:  Chronic combined  systolic and diastolic congestive heart failure    Diabetes mellitus type 2 without retinopathy        Chronic systolic HF, NYHA class II, stage C. LVEF 35-40%.  Etiology: ischemic CMP, new diagnosis in 2021, after STEMI in LAD distribution.  Devices: ICD.  Medications: metoprolol succinate, lisinopril low dose and jardiance. Bumex 1-2 mg daily (1 mg daily most of the days).  Hemodynamic status: warm, normotensive, low volume.  Clinical course: no HF hospitalizations.  Plan:  Patient seems to be doing quite well from CHF standpoint. ICM, however euvolemic on exam with recovery of LVEF 35-40% on TTE from 02/2022.   Happy to get repeat echo, however honestly seems to be doing quite well, NYHA FC II. Day to day doing well, but too much up and down the steps gets minimally short of breath.   All of this being said, she does not want an OHT. That being said, not interested in OHT right now, but wants to live so open to it again.   At this time, defer rest of cardiology care back to [facility] team, we are happy to see her again in 6 months with TTE, and if TTE still stable, can be annually even.

## 2022-10-05 DIAGNOSIS — I50.9 CONGESTIVE HEART FAILURE, UNSPECIFIED HF CHRONICITY, UNSPECIFIED HEART FAILURE TYPE: Primary | ICD-10-CM

## 2022-10-05 DIAGNOSIS — I50.42 CHRONIC COMBINED SYSTOLIC AND DIASTOLIC CONGESTIVE HEART FAILURE: ICD-10-CM

## 2022-10-05 DIAGNOSIS — I25.10 CORONARY ARTERY DISEASE INVOLVING NATIVE CORONARY ARTERY OF NATIVE HEART WITHOUT ANGINA PECTORIS: ICD-10-CM

## 2022-10-05 DIAGNOSIS — E78.5 DYSLIPIDEMIA: ICD-10-CM

## 2022-10-05 DIAGNOSIS — F17.201 TOBACCO ABUSE, IN REMISSION: ICD-10-CM

## 2022-10-05 DIAGNOSIS — R06.02 SHORTNESS OF BREATH: ICD-10-CM

## 2022-11-04 ENCOUNTER — TELEPHONE (OUTPATIENT)
Dept: CARDIOLOGY | Facility: HOSPITAL | Age: 67
End: 2022-11-04
Payer: MEDICARE

## 2022-11-08 ENCOUNTER — HOSPITAL ENCOUNTER (OUTPATIENT)
Dept: CARDIOLOGY | Facility: HOSPITAL | Age: 67
Discharge: HOME | End: 2022-11-08
Attending: INTERNAL MEDICINE
Payer: MEDICARE

## 2022-11-08 VITALS
BODY MASS INDEX: 28.12 KG/M2 | RESPIRATION RATE: 16 BRPM | HEIGHT: 66 IN | WEIGHT: 175 LBS | SYSTOLIC BLOOD PRESSURE: 117 MMHG | DIASTOLIC BLOOD PRESSURE: 66 MMHG | HEART RATE: 73 BPM

## 2022-11-08 DIAGNOSIS — R06.02 SHORTNESS OF BREATH: ICD-10-CM

## 2022-11-08 DIAGNOSIS — F17.201 TOBACCO ABUSE, IN REMISSION: ICD-10-CM

## 2022-11-08 DIAGNOSIS — I50.9 CONGESTIVE HEART FAILURE, UNSPECIFIED HF CHRONICITY, UNSPECIFIED HEART FAILURE TYPE: ICD-10-CM

## 2022-11-08 DIAGNOSIS — E78.5 DYSLIPIDEMIA: ICD-10-CM

## 2022-11-08 DIAGNOSIS — I25.10 CORONARY ARTERY DISEASE INVOLVING NATIVE CORONARY ARTERY OF NATIVE HEART WITHOUT ANGINA PECTORIS: ICD-10-CM

## 2022-11-08 DIAGNOSIS — I50.42 CHRONIC COMBINED SYSTOLIC AND DIASTOLIC CONGESTIVE HEART FAILURE: ICD-10-CM

## 2022-11-08 LAB
CFR FLOW - ANTERIOR: 1.48
CFR FLOW - INFERIOR: 1.26
CFR FLOW - LATERAL: 1.42
CFR FLOW - MAX: 2.53
CFR FLOW - MIN: 0.84
CFR FLOW - SEPTAL: 1.86
CFR FLOW - WHOLE HEART: 1.51
CFR FLOW- DEFECT 1: 1.72
CV STRESS BASE HR: 66 BPM
DIASTOLIC BLOOD PRESSURE: 71 MMHG
EJECTION FRACTION- HIGH: 65 %
END DIASTOLIC INDEX-HIGH: 153 ML/M2
END DIASTOLIC INDEX-LOW: 93 ML/M2
END SYSTOLIC INDEX-HIGH: 71 ML/M2
END SYSTOLIC INDEX-LOW: 31 ML/M2
NUC REST DIASTOLIC VOLUME INDEX: 132
NUC REST EJECTION FRACTION: 42
NUC REST SYSTOLIC VOLUME INDEX: 77
NUC STRESS DIASTOLIC VOLUME INDEX: 143
NUC STRESS EJECTION FRACTION: 29 %
NUC STRESS SYSTOLIC VOLUME INDEX: 102
OHS CV CPX 1 MINUTE RECOVERY HEART RATE: 99 BPM
OHS CV CPX 85 PERCENT MAX PREDICTED HEART RATE MALE: 126
OHS CV CPX MAX PREDICTED HEART RATE: 149
OHS CV CPX PATIENT IS FEMALE: 1
OHS CV CPX PATIENT IS MALE: 0
OHS CV CPX PEAK DIASTOLIC BLOOD PRESSURE: 72 MMHG
OHS CV CPX PEAK HEAR RATE: 83 BPM
OHS CV CPX PEAK RATE PRESSURE PRODUCT: NORMAL
OHS CV CPX PEAK SYSTOLIC BLOOD PRESSURE: 121 MMHG
OHS CV CPX PERCENT MAX PREDICTED HEART RATE ACHIEVED: 56
OHS CV CPX RATE PRESSURE PRODUCT PRESENTING: 7260
PERFUSION DEFECT 1 SIZE IN %: 47 %
PERFUSION DEFECT 2 SIZE IN %: 10 %
REST FLOW - ANTERIOR: 0.41 CC/MIN/G
REST FLOW - INFERIOR: 0.72 CC/MIN/G
REST FLOW - LATERAL: 0.71 CC/MIN/G
REST FLOW - MAX: 0.99 CC/MIN/G
REST FLOW - MIN: 0.15 CC/MIN/G
REST FLOW - SEPTAL: 0.28 CC/MIN/G
REST FLOW - WHOLE HEART: 0.53 CC/MIN/G
REST FLOW- DEFECT 1: 0.26 CC/MIN/G
RETIRED EF AND QEF - SEE NOTES: 53 %
STRESS FLOW - ANTERIOR: 0.62 CC/MIN/G
STRESS FLOW - INFERIOR: 0.88 CC/MIN/G
STRESS FLOW - LATERAL: 1 CC/MIN/G
STRESS FLOW - MAX: 1.53 CC/MIN/G
STRESS FLOW - MIN: 0.2 CC/MIN/G
STRESS FLOW - SEPTAL: 0.53 CC/MIN/G
STRESS FLOW - WHOLE HEART: 0.76 CC/MIN/G
STRESS FLOW- DEFECT 1: 0.45 CC/MIN/G
STRESS ST DEPRESSION: 2 MM
SYSTOLIC BLOOD PRESSURE: 110 MMHG

## 2022-11-08 PROCEDURE — 93018 CV STRESS TEST I&R ONLY: CPT | Mod: ,,, | Performed by: INTERNAL MEDICINE

## 2022-11-08 PROCEDURE — 78434 CARDIAC PET SCAN STRESS (CUPID ONLY): ICD-10-PCS | Mod: 26,,, | Performed by: INTERNAL MEDICINE

## 2022-11-08 PROCEDURE — A9555 RB82 RUBIDIUM: HCPCS

## 2022-11-08 PROCEDURE — 93016 CARDIAC PET SCAN STRESS (CUPID ONLY): ICD-10-PCS | Mod: ,,, | Performed by: INTERNAL MEDICINE

## 2022-11-08 PROCEDURE — 93016 CV STRESS TEST SUPVJ ONLY: CPT | Mod: ,,, | Performed by: INTERNAL MEDICINE

## 2022-11-08 PROCEDURE — 78431 MYOCRD IMG PET RST&STRS CT: CPT | Mod: 26,,, | Performed by: INTERNAL MEDICINE

## 2022-11-08 PROCEDURE — 93018 CARDIAC PET SCAN STRESS (CUPID ONLY): ICD-10-PCS | Mod: ,,, | Performed by: INTERNAL MEDICINE

## 2022-11-08 PROCEDURE — 78434 AQMBF PET REST & RX STRESS: CPT | Mod: 26,,, | Performed by: INTERNAL MEDICINE

## 2022-11-08 PROCEDURE — 78434 AQMBF PET REST & RX STRESS: CPT

## 2022-11-08 PROCEDURE — 78431 CARDIAC PET SCAN STRESS (CUPID ONLY): ICD-10-PCS | Mod: 26,,, | Performed by: INTERNAL MEDICINE

## 2022-11-08 PROCEDURE — 63600175 PHARM REV CODE 636 W HCPCS: Performed by: INTERNAL MEDICINE

## 2022-11-08 RX ORDER — REGADENOSON 0.08 MG/ML
0.4 INJECTION, SOLUTION INTRAVENOUS ONCE
Status: COMPLETED | OUTPATIENT
Start: 2022-11-08 | End: 2022-11-08

## 2022-11-08 RX ORDER — AMINOPHYLLINE 25 MG/ML
75 INJECTION, SOLUTION INTRAVENOUS ONCE
Status: COMPLETED | OUTPATIENT
Start: 2022-11-08 | End: 2022-11-08

## 2022-11-08 RX ORDER — CAFFEINE CITRATE 20 MG/ML
60 SOLUTION INTRAVENOUS ONCE
Status: COMPLETED | OUTPATIENT
Start: 2022-11-08 | End: 2022-11-08

## 2022-11-08 RX ADMIN — REGADENOSON 0.4 MG: 0.08 INJECTION, SOLUTION INTRAVENOUS at 07:11

## 2022-11-08 RX ADMIN — CAFFEINE CITRATE 60 MG: 20 INJECTION INTRAVENOUS at 07:11

## 2022-11-08 RX ADMIN — AMINOPHYLLINE 75 MG: 25 INJECTION, SOLUTION INTRAVENOUS at 07:11

## 2023-01-06 ENCOUNTER — DOCUMENTATION ONLY (OUTPATIENT)
Dept: TRANSPLANT | Facility: CLINIC | Age: 68
End: 2023-01-06

## 2023-01-06 ENCOUNTER — HOSPITAL ENCOUNTER (OUTPATIENT)
Dept: CARDIOLOGY | Facility: HOSPITAL | Age: 68
Discharge: HOME | End: 2023-01-06
Attending: INTERNAL MEDICINE
Payer: MEDICARE

## 2023-01-06 ENCOUNTER — OFFICE VISIT (OUTPATIENT)
Dept: TRANSPLANT | Facility: CLINIC | Age: 68
End: 2023-01-06
Payer: MEDICARE

## 2023-01-06 VITALS
HEART RATE: 62 BPM | DIASTOLIC BLOOD PRESSURE: 79 MMHG | WEIGHT: 172.63 LBS | BODY MASS INDEX: 27.74 KG/M2 | HEIGHT: 66 IN | SYSTOLIC BLOOD PRESSURE: 130 MMHG

## 2023-01-06 VITALS
SYSTOLIC BLOOD PRESSURE: 130 MMHG | HEART RATE: 70 BPM | DIASTOLIC BLOOD PRESSURE: 79 MMHG | WEIGHT: 172 LBS | BODY MASS INDEX: 27.64 KG/M2 | HEIGHT: 66 IN

## 2023-01-06 DIAGNOSIS — I25.5 ISCHEMIC CARDIOMYOPATHY: ICD-10-CM

## 2023-01-06 DIAGNOSIS — R06.02 SHORTNESS OF BREATH: ICD-10-CM

## 2023-01-06 DIAGNOSIS — I25.10 CORONARY ARTERY DISEASE INVOLVING NATIVE CORONARY ARTERY OF NATIVE HEART WITHOUT ANGINA PECTORIS: ICD-10-CM

## 2023-01-06 DIAGNOSIS — E78.5 DYSLIPIDEMIA: ICD-10-CM

## 2023-01-06 DIAGNOSIS — F17.201 TOBACCO ABUSE, IN REMISSION: ICD-10-CM

## 2023-01-06 DIAGNOSIS — Z01.89 NEEDS SLEEP APNEA ASSESSMENT: ICD-10-CM

## 2023-01-06 DIAGNOSIS — I25.5 ISCHEMIC CARDIOMYOPATHY: Primary | ICD-10-CM

## 2023-01-06 LAB
ASCENDING AORTA: 3.24 CM
AV INDEX (PROSTH): 0.8
AV MEAN GRADIENT: 3 MMHG
AV PEAK GRADIENT: 5 MMHG
AV VALVE AREA: 3.17 CM2
AV VELOCITY RATIO: 0.8
BSA FOR ECHO PROCEDURE: 1.91 M2
CV ECHO LV RWT: 0.38 CM
DOP CALC AO PEAK VEL: 1.12 M/S
DOP CALC AO VTI: 24.54 CM
DOP CALC LVOT AREA: 4 CM2
DOP CALC LVOT DIAMETER: 2.25 CM
DOP CALC LVOT PEAK VEL: 0.9 M/S
DOP CALC LVOT STROKE VOLUME: 77.81 CM3
DOP CALCLVOT PEAK VEL VTI: 19.58 CM
E WAVE DECELERATION TIME: 207.7 MSEC
E/A RATIO: 1.01
ECHO LV POSTERIOR WALL: 1.03 CM (ref 0.6–1.1)
EJECTION FRACTION: 35 %
FRACTIONAL SHORTENING: 20 % (ref 28–44)
INTERVENTRICULAR SEPTUM: 0.66 CM (ref 0.6–1.1)
IVRT: 122.74 MSEC
LA MAJOR: 5.16 CM
LA MINOR: 4.87 CM
LA WIDTH: 4.58 CM
LEFT ATRIUM SIZE: 3.87 CM
LEFT ATRIUM VOLUME INDEX MOD: 60.6 ML/M2
LEFT ATRIUM VOLUME INDEX: 40.2 ML/M2
LEFT ATRIUM VOLUME MOD: 114 CM3
LEFT ATRIUM VOLUME: 75.49 CM3
LEFT INTERNAL DIMENSION IN SYSTOLE: 4.36 CM (ref 2.1–4)
LEFT VENTRICLE DIASTOLIC VOLUME INDEX: 76.15 ML/M2
LEFT VENTRICLE DIASTOLIC VOLUME: 143.16 ML
LEFT VENTRICLE MASS INDEX: 89 G/M2
LEFT VENTRICLE SYSTOLIC VOLUME INDEX: 45.6 ML/M2
LEFT VENTRICLE SYSTOLIC VOLUME: 85.66 ML
LEFT VENTRICULAR INTERNAL DIMENSION IN DIASTOLE: 5.43 CM (ref 3.5–6)
LEFT VENTRICULAR MASS: 167.7 G
MV A" WAVE DURATION": 18.55 MSEC
MV PEAK A VEL: 0.67 M/S
MV PEAK E VEL: 0.68 M/S
MV STENOSIS PRESSURE HALF TIME: 60.23 MS
MV VALVE AREA P 1/2 METHOD: 3.65 CM2
PISA TR MAX VEL: 2.76 M/S
PULM VEIN S/D RATIO: 2.06
PV PEAK D VEL: 0.33 M/S
PV PEAK S VEL: 0.68 M/S
RA MAJOR: 4.5 CM
RA PRESSURE: 3 MMHG
RA WIDTH: 3.4 CM
RIGHT VENTRICULAR END-DIASTOLIC DIMENSION: 3.05 CM
RV TISSUE DOPPLER FREE WALL SYSTOLIC VELOCITY 1 (APICAL 4 CHAMBER VIEW): 11.21 CM/S
SINUS: 3.45 CM
STJ: 2.82 CM
TR MAX PG: 30 MMHG
TRICUSPID ANNULAR PLANE SYSTOLIC EXCURSION: 2.37 CM
TV REST PULMONARY ARTERY PRESSURE: 33 MMHG

## 2023-01-06 PROCEDURE — 3078F DIAST BP <80 MM HG: CPT | Mod: CPTII,S$GLB,, | Performed by: INTERNAL MEDICINE

## 2023-01-06 PROCEDURE — 99214 OFFICE O/P EST MOD 30 MIN: CPT | Mod: S$GLB,,, | Performed by: INTERNAL MEDICINE

## 2023-01-06 PROCEDURE — 99214 PR OFFICE/OUTPT VISIT, EST, LEVL IV, 30-39 MIN: ICD-10-PCS | Mod: S$GLB,,, | Performed by: INTERNAL MEDICINE

## 2023-01-06 PROCEDURE — 4010F PR ACE/ARB THEARPY RXD/TAKEN: ICD-10-PCS | Mod: CPTII,S$GLB,, | Performed by: INTERNAL MEDICINE

## 2023-01-06 PROCEDURE — 93306 TTE W/DOPPLER COMPLETE: CPT | Mod: 26,,, | Performed by: INTERNAL MEDICINE

## 2023-01-06 PROCEDURE — 99999 PR PBB SHADOW E&M-EST. PATIENT-LVL III: CPT | Mod: PBBFAC,,, | Performed by: INTERNAL MEDICINE

## 2023-01-06 PROCEDURE — 3008F BODY MASS INDEX DOCD: CPT | Mod: CPTII,S$GLB,, | Performed by: INTERNAL MEDICINE

## 2023-01-06 PROCEDURE — 3075F SYST BP GE 130 - 139MM HG: CPT | Mod: CPTII,S$GLB,, | Performed by: INTERNAL MEDICINE

## 2023-01-06 PROCEDURE — 1126F AMNT PAIN NOTED NONE PRSNT: CPT | Mod: CPTII,S$GLB,, | Performed by: INTERNAL MEDICINE

## 2023-01-06 PROCEDURE — C8929 TTE W OR WO FOL WCON,DOPPLER: HCPCS

## 2023-01-06 PROCEDURE — 3008F PR BODY MASS INDEX (BMI) DOCUMENTED: ICD-10-PCS | Mod: CPTII,S$GLB,, | Performed by: INTERNAL MEDICINE

## 2023-01-06 PROCEDURE — 4010F ACE/ARB THERAPY RXD/TAKEN: CPT | Mod: CPTII,S$GLB,, | Performed by: INTERNAL MEDICINE

## 2023-01-06 PROCEDURE — 99999 PR PBB SHADOW E&M-EST. PATIENT-LVL III: ICD-10-PCS | Mod: PBBFAC,,, | Performed by: INTERNAL MEDICINE

## 2023-01-06 PROCEDURE — 3078F PR MOST RECENT DIASTOLIC BLOOD PRESSURE < 80 MM HG: ICD-10-PCS | Mod: CPTII,S$GLB,, | Performed by: INTERNAL MEDICINE

## 2023-01-06 PROCEDURE — 93306 ECHO (CUPID ONLY): ICD-10-PCS | Mod: 26,,, | Performed by: INTERNAL MEDICINE

## 2023-01-06 PROCEDURE — 1126F PR PAIN SEVERITY QUANTIFIED, NO PAIN PRESENT: ICD-10-PCS | Mod: CPTII,S$GLB,, | Performed by: INTERNAL MEDICINE

## 2023-01-06 PROCEDURE — 3075F PR MOST RECENT SYSTOLIC BLOOD PRESS GE 130-139MM HG: ICD-10-PCS | Mod: CPTII,S$GLB,, | Performed by: INTERNAL MEDICINE

## 2023-01-06 RX ORDER — SPIRONOLACTONE 25 MG/1
25 TABLET ORAL DAILY
COMMUNITY
Start: 2023-01-02

## 2023-01-06 RX ORDER — LISINOPRIL 5 MG/1
5 TABLET ORAL 2 TIMES DAILY
Qty: 60 TABLET | Refills: 6 | Status: SHIPPED | OUTPATIENT
Start: 2023-01-06

## 2023-01-06 RX ORDER — LISINOPRIL 5 MG/1
2.5 TABLET ORAL 2 TIMES DAILY
Qty: 60 TABLET | Refills: 6 | Status: SHIPPED | OUTPATIENT
Start: 2023-01-06 | End: 2023-01-06 | Stop reason: SDUPTHER

## 2023-01-06 RX ORDER — ISOSORBIDE MONONITRATE 60 MG/1
60 TABLET, EXTENDED RELEASE ORAL DAILY
Qty: 30 TABLET | Refills: 11 | Status: SHIPPED | OUTPATIENT
Start: 2023-01-06 | End: 2024-01-06

## 2023-01-06 NOTE — PATIENT INSTRUCTIONS
Increase lisinopril to twice a day.     Stop bumex completely.    Increase isosorbide to 60mg daily.

## 2023-01-06 NOTE — PROGRESS NOTES
HPI.  65 yo woman with a PMH/o HTN, dyslipidemia, diabetes type 2, CAD, STEMI Feb, 2021, evidence on MI in LAD distribution on nuclear stress test, last LHC February 2021, when she had diffuse distal RCA 80-90% disease, ostial OM1 disease and LAD occlusion s/p PCI to LAD at that time. Ischemic cardiomyopathy, TTE on July 26, 2021: 25-30%. S/p ICD.  She also has a history of COPD, gout. Per her outside records, she has been treated with lisinopril 5 mg daily, metoprolol xl 150 mg daily, empagliflozin 10 mg daily, ISMN, bumex 1 mg daily, asa, statins.     Patient underwent RHC/CPX and TTE. TTE with LVEF 02/22 35-40%. RHC with low filling pressures, CI 1.96. did not tolerate entresto, switched back to lisinopril and felt great after doing so. Recently in August wasn't having any simptoms but her pcp wanted to re-evaluate her for CAD. Underwent LHC at [facility]  08/08/24, for this found to have radiolucency ostial LCMX suggestive of severe ISR, patient LAD stent with no significant ISR, unchanged RCA disease from prior angiogram. Patient on DAPT with statin. No further angina but has noticed some LUTZ going up and down the stairs. That being said, fluid status is stable, no volume issues at all, no admissions for CHF in a couple of years right after her MI. Patient denies N/V/F/C, lightheadedness, dizziness, PND, orthopnea, LE edema, abdominal pain, abdominal pressure, chest pain, chest pressure, syncope, pre-syncope. NYHA FC II.     TTE 01/06/23:  The estimated ejection fraction is 35%. Overall unchanged from prior study  The following segments are akinetic: basal anteroseptal, basal inferoseptal, mid anteroseptal, apical anterior, apical septal, apical inferior and apex suggesting ischemia/infarct in LAD territory. The following segments are hypokinetic: basal inferior and mid inferior suggesting ischemia/infarct in the RCA territory  The left ventricle is normal in size with moderately decreased systolic  function.  Grade I left ventricular diastolic dysfunction.  Normal right ventricular size with normal right ventricular systolic function.  Severe left atrial enlargement.  Mild mitral regurgitation.  The estimated PA systolic pressure is 33 mmHg.  Normal central venous pressure (3 mmHg).    TTE 02/02/22:  The estimated ejection fraction is 35-40%.  The following segments are akinetic: apical anterior, apical septal, apical inferior, apical lateral and apex.  The left ventricle is normal in size with moderately decreased systolic function.  Grade I left ventricular diastolic dysfunction.  Normal right ventricular size with normal right ventricular systolic function.  Mild tricuspid regurgitation.  Mild-to-moderate mitral regurgitation.  The estimated PA systolic pressure is 30 mmHg.  Normal central venous pressure (3 mmHg).    Past Medical History:   Diagnosis Date    CHF (congestive heart failure)     Coronary artery disease     Diabetes mellitus     Encounter for blood transfusion         Past Surgical History:   Procedure Laterality Date    CARDIAC DEFIBRILLATOR PLACEMENT      CORONARY ANGIOPLASTY WITH STENT PLACEMENT      HYSTERECTOMY      RIGHT HEART CATHETERIZATION Right 2/2/2022    Procedure: INSERTION, CATHETER, RIGHT HEART;  Surgeon: Ashley Salgado MD;  Location: [facility] CATH LAB;  Service: Cardiology;  Laterality: Right;        No family history on file.     Review of patient's allergies indicates:   Allergen Reactions    Sulfa (sulfonamide antibiotics) Itching    Penicillins Rash        Current Outpatient Medications   Medication Instructions    albuterol (PROVENTIL/VENTOLIN HFA) 90 mcg/actuation inhaler 2 puffs, Inhalation, Every 4 hours PRN    allopurinoL (ZYLOPRIM) 100 mg, Oral    aspirin (ECOTRIN) 81 mg, Oral    atorvastatin (LIPITOR) 80 mg, Oral, Daily    BRILINTA 90 mg, Oral, 2 times daily    budesonide-formoterol 80-4.5 mcg (SYMBICORT) 80-4.5 mcg/actuation HFAA 2 puffs, Inhalation    bumetanide  (BUMEX) 2 MG tablet Half a tablet every other day    cetirizine (ZYRTEC) 10 mg, Oral, Daily    gabapentin (NEURONTIN) 300 mg, Oral, 3 times daily PRN    isosorbide mononitrate (IMDUR) 30 mg, Oral, Daily    lisinopriL (PRINIVIL,ZESTRIL) 2.5 mg, Oral, Daily    metFORMIN (GLUCOPHAGE-XR) 500 MG ER 24hr tablet Oral, Daily    metoprolol succinate (TOPROL-XL) 150 mg, Oral, Daily    spironolactone (ALDACTONE) 25 mg, Oral, Daily    triamcinolone acetonide 0.1% (KENALOG) 0.1 % cream APPLY TWICE A DAY AS NEEDED FOR ITCHY RAISED SKIN. DO NOT USE ON FACE, ARMPITS, OR GROIN.        Lab Results   Component Value Date    BNP 91 09/12/2022     09/12/2022    K 3.6 09/12/2022    MG 1.9 01/03/2022     09/12/2022    CO2 28 09/12/2022    BUN 10 09/12/2022    CREATININE 0.8 09/12/2022     (H) 09/12/2022    HGBA1C 7.1 (H) 05/02/2022    HGBA1C 6.5 (H) 01/03/2022    AST 20 09/12/2022    ALT 23 09/12/2022    ALBUMIN 3.7 09/12/2022    PROT 7.0 09/12/2022    BILITOT 0.5 09/12/2022    WBC 6.19 09/12/2022    HGB 11.7 (L) 09/12/2022    HCT 35.6 (L) 09/12/2022     09/12/2022    TSH 1.113 01/03/2022    CHOL 135 02/21/2022    HDL 43 02/21/2022    LDLCALC 62 02/21/2022    TRIG 151 (H) 02/21/2022     No results found for this or any previous visit.     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 LVEF 35-40%.  Etiology: ischemic CMP, new diagnosis in 2021, after STEMI in LAD distribution.  Devices: ICD.  Medications: metoprolol succinate, lisinopril low dose and jardiance. Bumex 1-2 mg daily (1 mg daily most of the days).  Hemodynamic status: warm, normotensive, low volume.  Clinical course: no HF hospitalizations.  Plan:  Patient seems  to be doing quite well from CHF standpoint. ICM, however euvolemic on exam with recovery of LVEF 35-40% on TTE from 02/2022.   Increase lisinopril to BID dose.  Stop bumex completely, does not have volume on board.  Increase isosorbide to 60mg po daily.  Discussed with Merle, state LVEF is worse than what we have seen here. Will risk stratify as possible with RHC/CPX.

## 2023-02-16 ENCOUNTER — HOSPITAL ENCOUNTER (OUTPATIENT)
Dept: CARDIOLOGY | Facility: HOSPITAL | Age: 68
Discharge: HOME | End: 2023-02-16
Attending: INTERNAL MEDICINE
Payer: MEDICARE

## 2023-02-16 ENCOUNTER — HOSPITAL ENCOUNTER (OUTPATIENT)
Facility: HOSPITAL | Age: 68
Discharge: HOME | End: 2023-02-16
Attending: INTERNAL MEDICINE | Admitting: INTERNAL MEDICINE
Payer: MEDICARE

## 2023-02-16 VITALS
WEIGHT: 169.56 LBS | DIASTOLIC BLOOD PRESSURE: 80 MMHG | SYSTOLIC BLOOD PRESSURE: 123 MMHG | OXYGEN SATURATION: 98 % | HEART RATE: 80 BPM | TEMPERATURE: 98 F | HEIGHT: 66 IN | BODY MASS INDEX: 27.25 KG/M2 | RESPIRATION RATE: 18 BRPM

## 2023-02-16 VITALS
HEIGHT: 66 IN | WEIGHT: 169.75 LBS | HEART RATE: 82 BPM | SYSTOLIC BLOOD PRESSURE: 125 MMHG | DIASTOLIC BLOOD PRESSURE: 83 MMHG | BODY MASS INDEX: 27.28 KG/M2

## 2023-02-16 DIAGNOSIS — I25.5 ISCHEMIC CARDIOMYOPATHY: ICD-10-CM

## 2023-02-16 DIAGNOSIS — I25.10 CORONARY ARTERY DISEASE INVOLVING NATIVE CORONARY ARTERY OF NATIVE HEART WITHOUT ANGINA PECTORIS: ICD-10-CM

## 2023-02-16 LAB
CV STRESS BASE HR: 82 BPM
DIASTOLIC BLOOD PRESSURE: 83 MMHG
OHS CV CPX 1 MINUTE RECOVERY HEART RATE: 92 BPM
OHS CV CPX 85 PERCENT MAX PREDICTED HEART RATE MALE: 126
OHS CV CPX DATA GRADE - PEAK: 4.9
OHS CV CPX DATA O2 SAT - PEAK: 98
OHS CV CPX DATA O2 SAT - REST: 95
OHS CV CPX DATA SPEED - PEAK: 2.4
OHS CV CPX DATA TIME - PEAK: 7.5
OHS CV CPX DATA VE/VCO2 - PEAK: 34
OHS CV CPX DATA VE/VO2 - PEAK: 28
OHS CV CPX DATA VO2 - PEAK: 8.6
OHS CV CPX DATA VO2 - REST: 3.7
OHS CV CPX FEV1/FVC: 0.85
OHS CV CPX FORCED EXPIRATORY VOLUME: 1.29
OHS CV CPX FORCED VITAL CAPACITY (FVC): 1.52
OHS CV CPX HIGHEST VO: 27.5
OHS CV CPX MAX PREDICTED HEART RATE: 148
OHS CV CPX MAXIMAL VOLUNTARY VENTILATION (MVV) PREDICTED: 51.6
OHS CV CPX MAXIMAL VOLUNTARY VENTILATION (MVV): 37
OHS CV CPX MAXIUMUM EXERCISE VENTILATION (VE MAX): 17.4
OHS CV CPX PATIENT AGE: 66
OHS CV CPX PATIENT HEIGHT IN: 66
OHS CV CPX PATIENT IS FEMALE AGE 11-19: 0
OHS CV CPX PATIENT IS FEMALE AGE GREATER THAN 19: 1
OHS CV CPX PATIENT IS FEMALE AGE LESS THAN 11: 0
OHS CV CPX PATIENT IS FEMALE: 1
OHS CV CPX PATIENT IS MALE AGE 11-25: 0
OHS CV CPX PATIENT IS MALE AGE GREATER THAN 25: 0
OHS CV CPX PATIENT IS MALE AGE LESS THAN 11: 0
OHS CV CPX PATIENT IS MALE GREATER THAN 18: 0
OHS CV CPX PATIENT IS MALE LESS THAN OR EQUAL TO 18: 0
OHS CV CPX PATIENT IS MALE: 0
OHS CV CPX PATIENT WEIGHT RETURNED IN OZ: 2716.07
OHS CV CPX PEAK DIASTOLIC BLOOD PRESSURE: 74 MMHG
OHS CV CPX PEAK HEAR RATE: 96 BPM
OHS CV CPX PEAK RATE PRESSURE PRODUCT: NORMAL
OHS CV CPX PEAK SYSTOLIC BLOOD PRESSURE: 120 MMHG
OHS CV CPX PERCENT BODY FAT: 20.6
OHS CV CPX PERCENT MAX PREDICTED HEART RATE ACHIEVED: 65
OHS CV CPX PREDICTED VO2: 27.5 ML/KG/MIN
OHS CV CPX RATE PRESSURE PRODUCT PRESENTING: NORMAL
OHS CV CPX REST PET CO2: 31
OHS CV CPX VE/VCO2 SLOPE: 30.4
STRESS ECHO POST EXERCISE DUR MIN: 7 MINUTES
STRESS ECHO POST EXERCISE DUR SEC: 30 SECONDS
SYSTOLIC BLOOD PRESSURE: 125 MMHG

## 2023-02-16 PROCEDURE — 94621 CARDIOPULMONARY EXERCISE TESTING (CUPID ONLY): ICD-10-PCS | Mod: 26,,, | Performed by: INTERNAL MEDICINE

## 2023-02-16 PROCEDURE — 94621 CARDIOPULM EXERCISE TESTING: CPT

## 2023-02-16 PROCEDURE — C1751 CATH, INF, PER/CENT/MIDLINE: HCPCS | Performed by: INTERNAL MEDICINE

## 2023-02-16 PROCEDURE — 93451 RIGHT HEART CATH: CPT | Performed by: INTERNAL MEDICINE

## 2023-02-16 PROCEDURE — 93451 PR RIGHT HEART CATH O2 SATURATION & CARDIAC OUTPUT: ICD-10-PCS | Mod: 26,,, | Performed by: INTERNAL MEDICINE

## 2023-02-16 PROCEDURE — 94621 CARDIOPULM EXERCISE TESTING: CPT | Mod: 26,,, | Performed by: INTERNAL MEDICINE

## 2023-02-16 PROCEDURE — 93451 RIGHT HEART CATH: CPT | Mod: 26,,, | Performed by: INTERNAL MEDICINE

## 2023-02-16 PROCEDURE — C1894 INTRO/SHEATH, NON-LASER: HCPCS | Performed by: INTERNAL MEDICINE

## 2023-02-16 PROCEDURE — 25000003 PHARM REV CODE 250: Performed by: INTERNAL MEDICINE

## 2023-02-16 RX ORDER — SODIUM CHLORIDE 9 MG/ML
INJECTION, SOLUTION INTRAVENOUS
Status: DISCONTINUED | OUTPATIENT
Start: 2023-02-16 | End: 2023-02-16 | Stop reason: HOSPADM

## 2023-02-16 RX ORDER — LIDOCAINE HYDROCHLORIDE 10 MG/ML
INJECTION INFILTRATION; PERINEURAL
Status: DISCONTINUED | OUTPATIENT
Start: 2023-02-16 | End: 2023-02-16 | Stop reason: HOSPADM

## 2023-02-16 NOTE — Clinical Note
The PA catheter was repositioned to the main pulmonary artery. Hemodynamics were performed. O2 saturation was measured at 63%. CO=4.54  CI=2.44

## 2023-02-16 NOTE — H&P
H&P - Right heart catheterization     Patient:   Loan De Souza          MRN: [id number]              CS: [id number]  Age:   67 y.o.      Sex:  female  :  1955   Author:   Chandrika Huggins     Basic Information      Date of Service: 2023    Service:  Heart Transplant.       Chief Complaint   Presents for elective right heart catheterization     History of Present Illness      66 y/o AAF with ICM/HFrEF presenting for elective RHC     Review of Systems       All systems reviewed and negative as noted and/or described as follows:   .     Health Status   Allergies:    Review of patient's allergies indicates:   Allergen Reactions    Sulfa (sulfonamide antibiotics) Itching    Penicillins Rash     .   Current medications: Reviewed, no changes..   Problem list: Reviewed, no changes..      Histories   Family History: Reviewed as doucmented in chart.  Non-contributory..   Social History     Reviewed as documented in chart (Non-contributory.).        Physical Examination   VS/Measurements       Breastfeeding No     General:  Alert and oriented, No acute distress.    Neck:  No jugular venous distention.    Respiratory:  Lungs are clear to auscultation.    Cardiovascular:  Regular rhythm, No murmur, No edema.    Gastrointestinal:  Soft, Non-tender, No organomegaly.    Neurologic:  Alert, Oriented.       Impression and Plan   Diagnosis     1.  Cardiomyopathy, HFrEF        -  Plan for right heart catheterization        -  Risks, benefits, and alternatives discussed and all questions answered        -  Patient wishes to proceed as planned.

## 2023-02-16 NOTE — BRIEF OP NOTE
Patient tolerated the procedure well. Please see complete procedure note for full details and results. Stable to return from cath lab to their room.  Procedure: Right heart catheterization   Procedure date: 02/16/2023  Discharge date: 02/16/2023  Estimated blood loss: less than 10 cc  Complications: none  Condition at discharge: stable  Discharge instructions: no heavy lifting greater than 5 lbs and no bearing down/coughing without holding pressure over incision site.

## 2023-02-16 NOTE — DISCHARGE SUMMARY
[facility] - Cath Lab  Discharge Note  Short Stay    Procedure(s) (LRB):  INSERTION, CATHETER, RIGHT HEART (Right)      OUTCOME: Patient tolerated treatment/procedure well without complication and is now ready for discharge.    DISPOSITION: Home or Self Care    FINAL DIAGNOSIS:  <principal problem not specified>    FOLLOWUP: In clinic    DISCHARGE INSTRUCTIONS:  No discharge procedures on file.     TIME SPENT ON DISCHARGE: 10 minutes

## 2023-02-16 NOTE — NURSING
Received patient from cath lab via wheelchair. Right IJ with dressing CDI, no bleeding, no hematoma.  Nutrition offered to patient.  Will continue to monitor
